# Patient Record
Sex: FEMALE | Race: WHITE | NOT HISPANIC OR LATINO | Employment: FULL TIME | RURAL
[De-identification: names, ages, dates, MRNs, and addresses within clinical notes are randomized per-mention and may not be internally consistent; named-entity substitution may affect disease eponyms.]

---

## 2020-07-08 ENCOUNTER — HISTORICAL (OUTPATIENT)
Dept: ADMINISTRATIVE | Facility: HOSPITAL | Age: 30
End: 2020-07-08

## 2020-07-08 LAB — SARS-COV+SARS-COV-2 AG RESP QL IA.RAPID: NEGATIVE

## 2022-06-13 ENCOUNTER — HOSPITAL ENCOUNTER (OUTPATIENT)
Dept: RADIOLOGY | Facility: HOSPITAL | Age: 32
Discharge: HOME | End: 2022-06-13
Attending: NURSE PRACTITIONER
Payer: COMMERCIAL

## 2022-06-13 DIAGNOSIS — M25.569 KNEE PAIN: ICD-10-CM

## 2022-06-13 DIAGNOSIS — S89.90XA KNEE INJURY: ICD-10-CM

## 2022-06-13 DIAGNOSIS — M25.569 KNEE PAIN: Primary | ICD-10-CM

## 2022-06-13 PROCEDURE — 73562 X-RAY EXAM OF KNEE 3: CPT | Mod: TC,RT

## 2022-08-11 ENCOUNTER — HOSPITAL ENCOUNTER (OUTPATIENT)
Facility: HOSPITAL | Age: 32
Discharge: HOME | End: 2022-08-11
Attending: ORTHOPAEDIC SURGERY | Admitting: ORTHOPAEDIC SURGERY
Payer: COMMERCIAL

## 2022-08-11 ENCOUNTER — ANESTHESIA EVENT (OUTPATIENT)
Dept: SURGERY | Facility: HOSPITAL | Age: 32
End: 2022-08-11
Payer: COMMERCIAL

## 2022-08-11 ENCOUNTER — ANESTHESIA (OUTPATIENT)
Dept: SURGERY | Facility: HOSPITAL | Age: 32
End: 2022-08-11
Payer: COMMERCIAL

## 2022-08-11 VITALS
HEART RATE: 71 BPM | RESPIRATION RATE: 16 BRPM | SYSTOLIC BLOOD PRESSURE: 124 MMHG | DIASTOLIC BLOOD PRESSURE: 76 MMHG | OXYGEN SATURATION: 96 % | TEMPERATURE: 98 F

## 2022-08-11 DIAGNOSIS — S83.241A ACUTE MEDIAL MENISCUS TEAR OF RIGHT KNEE: ICD-10-CM

## 2022-08-11 PROBLEM — M25.361 PATELLAR INSTABILITY OF RIGHT KNEE: Status: ACTIVE | Noted: 2022-08-11

## 2022-08-11 LAB — POC URINE HCG: NEGATIVE

## 2022-08-11 PROCEDURE — D9220A PRA ANESTHESIA: Mod: ANES,,, | Performed by: ANESTHESIOLOGY

## 2022-08-11 PROCEDURE — 25000003 PHARM REV CODE 250: Performed by: NURSE ANESTHETIST, CERTIFIED REGISTERED

## 2022-08-11 PROCEDURE — 27000655: Performed by: ANESTHESIOLOGY

## 2022-08-11 PROCEDURE — 36000710: Performed by: ORTHOPAEDIC SURGERY

## 2022-08-11 PROCEDURE — D9220A PRA ANESTHESIA: ICD-10-PCS | Mod: CRNA,,, | Performed by: NURSE ANESTHETIST, CERTIFIED REGISTERED

## 2022-08-11 PROCEDURE — 37000009 HC ANESTHESIA EA ADD 15 MINS: Performed by: ORTHOPAEDIC SURGERY

## 2022-08-11 PROCEDURE — 63600175 PHARM REV CODE 636 W HCPCS: Performed by: NURSE ANESTHETIST, CERTIFIED REGISTERED

## 2022-08-11 PROCEDURE — 63600175 PHARM REV CODE 636 W HCPCS: Performed by: ORTHOPAEDIC SURGERY

## 2022-08-11 PROCEDURE — D9220A PRA ANESTHESIA: Mod: CRNA,,, | Performed by: NURSE ANESTHETIST, CERTIFIED REGISTERED

## 2022-08-11 PROCEDURE — 27000716 HC OXISENSOR PROBE, ANY SIZE: Performed by: ANESTHESIOLOGY

## 2022-08-11 PROCEDURE — 71000015 HC POSTOP RECOV 1ST HR: Performed by: ORTHOPAEDIC SURGERY

## 2022-08-11 PROCEDURE — 36000711: Performed by: ORTHOPAEDIC SURGERY

## 2022-08-11 PROCEDURE — D9220A PRA ANESTHESIA: ICD-10-PCS | Mod: ANES,,, | Performed by: ANESTHESIOLOGY

## 2022-08-11 PROCEDURE — 25000003 PHARM REV CODE 250: Performed by: ORTHOPAEDIC SURGERY

## 2022-08-11 PROCEDURE — 27000177 HC AIRWAY, LARYNGEAL MASK: Performed by: ANESTHESIOLOGY

## 2022-08-11 PROCEDURE — 27000510 HC BLANKET BAIR HUGGER ANY SIZE: Performed by: ANESTHESIOLOGY

## 2022-08-11 PROCEDURE — 37000008 HC ANESTHESIA 1ST 15 MINUTES: Performed by: ORTHOPAEDIC SURGERY

## 2022-08-11 PROCEDURE — 71000033 HC RECOVERY, INTIAL HOUR: Performed by: ORTHOPAEDIC SURGERY

## 2022-08-11 PROCEDURE — 97161 PT EVAL LOW COMPLEX 20 MIN: CPT

## 2022-08-11 PROCEDURE — 27201423 OPTIME MED/SURG SUP & DEVICES STERILE SUPPLY: Performed by: ORTHOPAEDIC SURGERY

## 2022-08-11 RX ORDER — BUPIVACAINE HYDROCHLORIDE 2.5 MG/ML
INJECTION, SOLUTION EPIDURAL; INFILTRATION; INTRACAUDAL
Status: DISCONTINUED | OUTPATIENT
Start: 2022-08-11 | End: 2022-08-11 | Stop reason: HOSPADM

## 2022-08-11 RX ORDER — EPINEPHRINE 1 MG/ML
INJECTION, SOLUTION INTRACARDIAC; INTRAMUSCULAR; INTRAVENOUS; SUBCUTANEOUS
Status: DISCONTINUED | OUTPATIENT
Start: 2022-08-11 | End: 2022-08-11 | Stop reason: HOSPADM

## 2022-08-11 RX ORDER — OXYCODONE AND ACETAMINOPHEN 10; 325 MG/1; MG/1
1 TABLET ORAL EVERY 6 HOURS PRN
Qty: 28 TABLET | Refills: 0 | Status: SHIPPED | OUTPATIENT
Start: 2022-08-11 | End: 2023-10-22 | Stop reason: CLARIF

## 2022-08-11 RX ORDER — PROMETHAZINE HYDROCHLORIDE 25 MG/1
25 TABLET ORAL EVERY 6 HOURS PRN
Status: DISCONTINUED | OUTPATIENT
Start: 2022-08-11 | End: 2022-08-11 | Stop reason: HOSPADM

## 2022-08-11 RX ORDER — PHENYLEPHRINE HYDROCHLORIDE 10 MG/ML
INJECTION INTRAVENOUS
Status: DISCONTINUED | OUTPATIENT
Start: 2022-08-11 | End: 2022-08-11

## 2022-08-11 RX ORDER — HYDROCODONE BITARTRATE AND ACETAMINOPHEN 10; 325 MG/1; MG/1
1 TABLET ORAL EVERY 4 HOURS PRN
Status: DISCONTINUED | OUTPATIENT
Start: 2022-08-11 | End: 2022-08-11 | Stop reason: HOSPADM

## 2022-08-11 RX ORDER — ACETAMINOPHEN 500 MG
1000 TABLET ORAL EVERY 6 HOURS PRN
Status: DISCONTINUED | OUTPATIENT
Start: 2022-08-11 | End: 2022-08-11 | Stop reason: HOSPADM

## 2022-08-11 RX ORDER — HYDROCODONE BITARTRATE AND ACETAMINOPHEN 5; 325 MG/1; MG/1
1 TABLET ORAL EVERY 4 HOURS PRN
Status: DISCONTINUED | OUTPATIENT
Start: 2022-08-11 | End: 2022-08-11 | Stop reason: HOSPADM

## 2022-08-11 RX ORDER — PROPOFOL 10 MG/ML
VIAL (ML) INTRAVENOUS
Status: DISCONTINUED | OUTPATIENT
Start: 2022-08-11 | End: 2022-08-11

## 2022-08-11 RX ORDER — FENTANYL CITRATE 50 UG/ML
INJECTION, SOLUTION INTRAMUSCULAR; INTRAVENOUS
Status: DISCONTINUED | OUTPATIENT
Start: 2022-08-11 | End: 2022-08-11

## 2022-08-11 RX ORDER — LIDOCAINE HYDROCHLORIDE 20 MG/ML
INJECTION, SOLUTION EPIDURAL; INFILTRATION; INTRACAUDAL; PERINEURAL
Status: DISCONTINUED | OUTPATIENT
Start: 2022-08-11 | End: 2022-08-11

## 2022-08-11 RX ORDER — ONDANSETRON 2 MG/ML
INJECTION INTRAMUSCULAR; INTRAVENOUS
Status: DISCONTINUED | OUTPATIENT
Start: 2022-08-11 | End: 2022-08-11

## 2022-08-11 RX ORDER — MIDAZOLAM HYDROCHLORIDE 1 MG/ML
INJECTION INTRAMUSCULAR; INTRAVENOUS
Status: DISCONTINUED | OUTPATIENT
Start: 2022-08-11 | End: 2022-08-11

## 2022-08-11 RX ORDER — DEXAMETHASONE SODIUM PHOSPHATE 4 MG/ML
INJECTION, SOLUTION INTRA-ARTICULAR; INTRALESIONAL; INTRAMUSCULAR; INTRAVENOUS; SOFT TISSUE
Status: DISCONTINUED | OUTPATIENT
Start: 2022-08-11 | End: 2022-08-11

## 2022-08-11 RX ORDER — SODIUM CHLORIDE 9 MG/ML
INJECTION, SOLUTION INTRAVENOUS CONTINUOUS
Status: DISCONTINUED | OUTPATIENT
Start: 2022-08-11 | End: 2022-08-11 | Stop reason: HOSPADM

## 2022-08-11 RX ORDER — CEFAZOLIN SODIUM 2 G/50ML
2 SOLUTION INTRAVENOUS
Status: COMPLETED | OUTPATIENT
Start: 2022-08-11 | End: 2022-08-11

## 2022-08-11 RX ORDER — ONDANSETRON 4 MG/1
8 TABLET, ORALLY DISINTEGRATING ORAL EVERY 8 HOURS PRN
Status: DISCONTINUED | OUTPATIENT
Start: 2022-08-11 | End: 2022-08-11 | Stop reason: HOSPADM

## 2022-08-11 RX ADMIN — HYDROCODONE BITARTRATE AND ACETAMINOPHEN 1 TABLET: 10; 325 TABLET ORAL at 05:08

## 2022-08-11 RX ADMIN — SODIUM CHLORIDE: 9 INJECTION, SOLUTION INTRAVENOUS at 01:08

## 2022-08-11 RX ADMIN — MIDAZOLAM 2 MG: 1 INJECTION INTRAMUSCULAR; INTRAVENOUS at 01:08

## 2022-08-11 RX ADMIN — PHENYLEPHRINE HYDROCHLORIDE 100 MCG: 10 INJECTION INTRAVENOUS at 02:08

## 2022-08-11 RX ADMIN — ONDANSETRON 4 MG: 2 INJECTION INTRAMUSCULAR; INTRAVENOUS at 02:08

## 2022-08-11 RX ADMIN — FENTANYL CITRATE 50 MCG: 50 INJECTION INTRAMUSCULAR; INTRAVENOUS at 02:08

## 2022-08-11 RX ADMIN — PROPOFOL 200 MG: 10 INJECTION, EMULSION INTRAVENOUS at 02:08

## 2022-08-11 RX ADMIN — LIDOCAINE HYDROCHLORIDE 50 MG: 20 INJECTION, SOLUTION EPIDURAL; INFILTRATION; INTRACAUDAL; PERINEURAL at 02:08

## 2022-08-11 RX ADMIN — DEXAMETHASONE SODIUM PHOSPHATE 8 MG: 4 INJECTION, SOLUTION INTRA-ARTICULAR; INTRALESIONAL; INTRAMUSCULAR; INTRAVENOUS; SOFT TISSUE at 02:08

## 2022-08-11 RX ADMIN — FENTANYL CITRATE 25 MCG: 50 INJECTION INTRAMUSCULAR; INTRAVENOUS at 03:08

## 2022-08-11 RX ADMIN — FENTANYL CITRATE 50 MCG: 50 INJECTION INTRAMUSCULAR; INTRAVENOUS at 03:08

## 2022-08-11 RX ADMIN — CEFAZOLIN SODIUM 2 G: 1 INJECTION, POWDER, FOR SOLUTION INTRAMUSCULAR; INTRAVENOUS at 02:08

## 2022-08-11 NOTE — ANESTHESIA POSTPROCEDURE EVALUATION
Anesthesia Post Evaluation    Patient: Rosalia Toth    Procedure(s) Performed: Procedure(s) (LRB):  ARTHROSCOPY, KNEE (Right)  ARTHROSCOPY, KNEE, WITH MENISCECTOMY, PARTIAL MEDIAL AND LATERAL (Right)  ARTHROSCOPY, KNEE, WITH PATELLOFEMORAL RELEASE (Right)  ARTHROSCOPIC REALIGNMENT, PATELLA (Right)    Final Anesthesia Type: general      Patient location during evaluation: PACU  Post-procedure vital signs: reviewed and stable  Pain management: adequate  Airway patency: patent  NABILA mitigation strategies: Extubation and recovery carried out in lateral, semiupright, or other nonsupine position  PONV status at discharge: No PONV  Anesthetic complications: no      Cardiovascular status: hemodynamically stable  Respiratory status: unassisted  Hydration status: euvolemic  Follow-up not needed.          Vitals Value Taken Time   /76 08/11/22 1730   Temp 36.6 °C (97.9 °F) 08/11/22 1610   Pulse 75 08/11/22 1740   Resp 16 08/11/22 1724   SpO2 98 % 08/11/22 1740   Vitals shown include unvalidated device data.      Event Time   Out of Recovery 16:40:00         Pain/Alana Score: Pain Rating Prior to Med Admin: 4 (8/11/2022  5:24 PM)  Alana Score: 10 (8/11/2022  4:35 PM)

## 2022-08-11 NOTE — INTERVAL H&P NOTE
The patient has been examined and the H&P has been reviewed:    I concur with the findings and no changes have occurred since H&P was written.    Surgery risks, benefits and alternative options discussed and understood by patient/family.          Active Hospital Problems    Diagnosis  POA    *Acute medial meniscus tear of right knee [S83.241A]  Yes    Patellar instability of right knee [M25.361]  Yes      Resolved Hospital Problems   No resolved problems to display.

## 2022-08-11 NOTE — ANESTHESIA PROCEDURE NOTES
Intubation    Date/Time: 8/11/2022 2:06 PM  Performed by: Freddy Wan CRNA  Authorized by: Que Abarca MD     Intubation:     Induction:  Intravenous    Intubated:  Postinduction    Mask Ventilation:  Easy mask    Attempts:  1    Attempted By:  CRNA    Method of Intubation:  Direct    Difficult Airway Encountered?: No      Complications:  None    Airway Device:  Supraglottic airway/LMA    Airway Device Size:  4.0    Style/Cuff Inflation:  Cuffed (inflated to minimal occlusive pressure)    Placement Verified By:  Capnometry    Complicating Factors:  None    Findings Post-Intubation:  BS equal bilateral and atraumatic/condition of teeth unchanged

## 2022-08-11 NOTE — OP NOTE
[facility] - Orthopedic Periop Services  General Surgery  Operative Note    SUMMARY     Date of Procedure: 8/11/2022     Procedure: Procedure(s) (LRB):  ARTHROSCOPY, KNEE (Right)  ARTHROSCOPY, KNEE, WITH MENISCECTOMY, PARTIAL MEDIAL AND LATERAL (Right)  ARTHROSCOPY, KNEE, WITH PATELLOFEMORAL RELEASE (Right)  ARTHROSCOPIC REALIGNMENT, PATELLA (Right)       Surgeon(s) and Role:     * Zelalem Irby MD - Primary    Assisting Surgeon: None    Pre-Operative Diagnosis: Acute pain of right knee [M25.561]  Acute medial meniscus tear of right knee, initial encounter [S83.241A]  Patellar dislocation, right, initial encounter [S83.004A]    Post-Operative Diagnosis: Post-Op Diagnosis Codes:     * Acute pain of right knee [M25.561]     * Acute medial meniscus tear of right knee, initial encounter [S83.241A]     * Patellar dislocation, right, initial encounter [S83.004A]    Anesthesia: Choice    Operative Findings (including complications, if any):  After risks and benefits of procedure explained at length the patient stating she understands risks and benefits written informed consent was obtained patient was taken operating room placed in supine position on operative table.  This time anesthesia was induced per Anesthesia right lower extremity had a tourniquet placed over cast padding this was placed in arthroscopic leg hyman left knee placed on a well leg hyman.  At this time right lower extremities prepped draped sterile fashion after through the table been let down.  Inferior medial inferior lateral portals were marked on the skin injected his was a superior lateral portal marked on the skin this time inferior lateral portal was made the inferior border the patella on the lateral border the patellar tendon stab incision made blunt trocar used to introduce the cannula into the knee.  Camera introduced knee inflated with saline.  The patella was noted be tracking laterally there was a tear of the retinaculum medially medial  gutter the retinacular tear was noted medial joint line there was tear of the posterior horn extending the body of the medial meniscus needing debridement ACL PCL popliteus tendons were intact lateral meniscus had a small tear of the body lateral meniscus.  Medial portal was then made placing a needle followed by stab incision.  Probe placed in medial meniscus tear confirmed lateral meniscus tear confirmed retinacular tear confirmed ACL PCL popliteus tendons were intact.  This time biter was placed in the medial joint line medial meniscus tear debrided shaver used to remove any debrided material smooth edge.  Probe placed in the further tears medially.  Biter placed in followed by the shaver in lateral joint line in the lateral meniscus tear was debrided and the knee debrided material removed with a shaver the edge smoothed.  Probe placed in the further tears laterally.  At this time a superior lateral portal was made placing a needle followed by stab incision cannula placed.  Working through the cannula superiorly with the camera lateral portal using a 2 we needle the 1. PDS was passed in mattress fashion 3 sutures placed medially to repair the retinaculum.  Once the sutures were placed brought out through the cannula lateral patellofemoral release was performed using arthroscopic Bovie and released in retinaculum from superior to inferior pole of patella laterally.  At this time the 3 PDS sutures were then tied individually repaired the retinaculum and performing a medial realignment of the patella.  The patella was then checked and noted to be tracking is normal position on the femur.  There was some mild grade 1 chondromalacia of the medial facet of the patella and the lateral facet of the patella no osteochondral lesion was noted.  This time instruments fluid removed portals closed with 4-0 nylon horizontal mattress suture sterile occlusive dressing placed followed by the immobilizer.  Patient was awakened  taken recovery in good condition.  All counts correct.  No complications.    Description of Technical Procedures:     Significant Surgical Tasks Conducted by the Assistant(s), if Applicable:     Estimated Blood Loss (EBL): * No values recorded between 8/11/2022  3:02 PM and 8/11/2022  4:01 PM *           Implants: * No implants in log *    Specimens:   Specimen (24h ago, onward)            None                  Condition: Good    Disposition: PACU - hemodynamically stable.    Attestation: I was present and scrubbed for the entire procedure.

## 2022-08-11 NOTE — TRANSFER OF CARE
Anesthesia Transfer of Care Note    Patient: Rosalia Toth    Procedure(s) Performed: Procedure(s) (LRB):  ARTHROSCOPY, KNEE (Right)  ARTHROSCOPY, KNEE, WITH MENISCECTOMY, PARTIAL MEDIAL AND LATERAL (Right)  ARTHROSCOPY, KNEE, WITH PATELLOFEMORAL RELEASE (Right)  ARTHROSCOPIC REALIGNMENT, PATELLA (Right)    Patient location: PACU    Anesthesia Type: general    Transport from OR: Transported from OR on 6-10 L/min O2 by face mask with adequate spontaneous ventilation    Post pain: adequate analgesia    Post assessment: no apparent anesthetic complications    Post vital signs: stable    Level of consciousness: awake and alert    Nausea/Vomiting: no nausea/vomiting    Complications: none    Transfer of care protocol was followed      Last vitals:   Visit Vitals  BP (!) 110/57   Pulse 66   Temp 36.6 °C (97.9 °F)   Resp 17   LMP 08/11/2022   SpO2 99%   Breastfeeding No

## 2022-08-11 NOTE — PLAN OF CARE
1609  Pt vs stable pt resting well pt sao2 100% on 7l fm pt dsg d/i to r knee imobilizer intact  Toes to r foot with good color cap refill less than 3 sec will monitor..    1640  Pt vs stable pt resting well pt sao2 93% on ra pt voices no complaints pt pain level 0 orders to release to room per md    8280 pt released to a floyd rn b/p 120/68 pulse 88 resp 16 sao2 95% on ra

## 2022-08-11 NOTE — PLAN OF CARE
Problem: Physical Therapy  Goal: Physical Therapy Goal  Description: Short Term Goals  Independent with HEP  Independent with crutchesx 10 feet FWB/WBAT: right lower extremity    Long term goals  Needed equipment for home.       Outcome: Ongoing, Progressing

## 2022-08-11 NOTE — H&P (VIEW-ONLY)
Department of Orthopedic Surgery    History and Physical         HISTORY:  32-year-old female sustained an injury to her right knee sustaining a medial meniscus tear with dislocation of the patella with medial retinacular tear and osteochondral injury of the medial facet of the patella needing arthroscopic evaluation debridement possible repair of the knee with lateral patellofemoral release and medial retinacular repair    PAST MEDICAL HISTORY:   Past Medical History:   Diagnosis Date    Polycystic kidney         PAST SURGICAL HISTORY:   Past Surgical History:   Procedure Laterality Date    SURGICAL REMOVAL OF MASS OF CHEEK            ALLERGIES: Review of patient's allergies indicates:  No Known Allergies       MEDICATIONS: (Not in a hospital admission)       SOCIAL HISTORY:   Social History     Socioeconomic History    Marital status:    Tobacco Use    Smoking status: Never Smoker    Smokeless tobacco: Never Used   Substance and Sexual Activity    Alcohol use: Never    Drug use: Never          FAMILY HISTORY:   Family History   Problem Relation Age of Onset    Polycystic kidney disease Father     Hypertension Father           PHYSICAL EXAM:   There were no vitals filed for this visit.  Body mass index is 36.28 kg/m².     In general, this is a well-developed, well-nourished female . The patient is alert, oriented and cooperative.      HEENT:  Normocephalic, atraumatic.  Extraocular movements are intact bilaterally.  The oropharynx is benign.       NECK:  Nontender with good range of motion.      LUNGS:  Clear to auscultation bilaterally.      HEART:  Demonstrates a regular rate and rhythm.  No murmurs appreciated.      ABDOMEN:  Soft, non-tender, non-distended.        EXTREMITIES:  Right lower extremity she moves her toes she has sensation to touch has palpable pulses she has 1+ effusion she is tender palpation over her medial retinaculum over the lateral aspect of her patella she also has pain  over the medial joint line there is 1+ effusion noted no instability on varus valgus stress she does have positive apprehension test      RADIOGRAPHIC FINDINGS:  X-rays show no fracture subluxation the knee on the right MRI shows osteochondral injury of the patella with medial meniscus tear medial retinacular tear      IMPRESSION:  Patellar instability medial retinacular tear medial meniscus tear with osteochondral injury the patella right knee      PLAN:  Arthroscopic evaluation debridement possible repair of the right knee with lateral patellofemoral release medial retinacular repair    I had a long discussion with the patient about treatment options, including operative and nonoperative treatments. We discussed pros and cons of each including risks pertinent to surgery including pain, infection, bleeding, damage to adjacent structures like nerves and blood vessels, failure to heal, need for future surgeries, stiffness, instability, loss of limb, anesthesia risks like stroke, blood clot, loss of life. We discussed the possibility of need for later hardware removal in the case that hardware was used. We discussed common and uncommon risks, and discussed patient specific factors that may increase the risks present with surgery. All questions were answered. The patient expressed understanding of the pros and cons of surgery and wanted to proceed with surgical treatment.        (Subject to voice recognition error, transcription service not allowed)

## 2022-08-11 NOTE — ANESTHESIA PREPROCEDURE EVALUATION
08/11/2022  Rosalia Toth is a 32 y.o., female.      Pre-op Assessment    I have reviewed the Patient Summary Reports.    I have reviewed the NPO Status.   I have reviewed the Medications.     Review of Systems         Anesthesia Plan  Type of Anesthesia, risks & benefits discussed:    Anesthesia Type: Gen Supraglottic Airway  Intra-op Monitoring Plan: Standard ASA Monitors  Post Op Pain Control Plan: IV/PO Opioids PRN  Induction:  IV  Informed Consent: Informed consent signed with the Patient and all parties understand the risks and agree with anesthesia plan.  All questions answered.   ASA Score: 2    Ready For Surgery From Anesthesia Perspective.     .  NPO greater than 8 hours  NAC  NKDA    HCG negative    Polycystic kidney disease    Airway exam deferred (COVID precautions); adequate ROM at neck.

## 2022-08-12 NOTE — PT/OT/SLP EVAL
Physical Therapy Evaluation    Patient Name:  Rosalia Toth   MRN:  [id number]    Recommendations:     Discharge Recommendations:  home   Discharge Equipment Recommendations: crutches   Barriers to discharge: None    Assessment:     Rosalia Toth is a 32 y.o. female admitted with a medical diagnosis of Acute medial meniscus tear of right knee.  She presents with the following impairments/functional limitations:  decreased ROM, gait instability Patient with good understanding of crutches wbat and need for knee immobilization.    Rehab Prognosis: Good; patient would benefit from acute skilled PT services to address these deficits and reach maximum level of function.    Recent Surgery: Procedure(s) (LRB):  ARTHROSCOPY, KNEE (Right)  ARTHROSCOPY, KNEE, WITH MENISCECTOMY, PARTIAL MEDIAL AND LATERAL (Right)  ARTHROSCOPY, KNEE, WITH PATELLOFEMORAL RELEASE (Right)  ARTHROSCOPIC REALIGNMENT, PATELLA (Right) Day of Surgery    Plan:     During this hospitalization, patient to be seen 1 x/week to address the identified rehab impairments via gait training, therapeutic activities and progress toward the following goals:    · Plan of Care Expires:  08/11/22    Subjective     Chief Complaint: post op pain  Patient/Family Comments/goals: Plan is for home today  Pain/Comfort:  · Pain Rating 1: 4/10  · Location - Side 1: Right  · Location 1: knee  · Pain Addressed 1: Cessation of Activity  · Pain Addressed 2: Cessation of Activity    Patients cultural, spiritual, [employer] conflicts given the current situation: no    Living Environment:  Lives with family  Prior to admission, patients level of function was independent.  Equipment used at home: none.  DME owned (not currently used): rolling walker.  Upon discharge, patient will have assistance from .    Objective:     Communicated with nurse prior to session.  Patient found supine with    upon PT entry to room.    General Precautions: Standard, fall   Orthopedic Precautions:     Braces: Knee immobilizer  Respiratory Status: Room air    Exams:  · na     Functional Mobility:  · Gait: ambulated 20 feet with crutches wbat    Therapeutic Activities and Exercises:   HEP aps, slr, qs    AM-PAC 6 CLICK MOBILITY  Total Score:18     Patient left supine with call button in reach.    GOALS:   Multidisciplinary Problems     Physical Therapy Goals        Problem: Physical Therapy    Goal Priority Disciplines Outcome Goal Variances Interventions   Physical Therapy Goal     PT, PT/OT Ongoing, Progressing     Description: Short Term Goals  Independent with HEP  Independent with crutchesx 10 feet FWB/WBAT: right lower extremity    Long term goals  Needed equipment for home.                        History:     Past Medical History:   Diagnosis Date    Polycystic kidney        Past Surgical History:   Procedure Laterality Date    SURGICAL REMOVAL OF MASS OF CHEEK         Time Tracking:     PT Received On: 08/11/22  PT Start Time: 1630     PT Stop Time: 1655  PT Total Time (min): 25 min     Billable Minutes: Evaluation 20 08/11/2022

## 2022-08-30 ENCOUNTER — CLINICAL SUPPORT (OUTPATIENT)
Dept: REHABILITATION | Facility: HOSPITAL | Age: 32
End: 2022-08-30
Payer: COMMERCIAL

## 2022-08-30 DIAGNOSIS — M25.561 ACUTE PAIN OF RIGHT KNEE: ICD-10-CM

## 2022-08-30 DIAGNOSIS — S83.241A ACUTE MEDIAL MENISCUS TEAR OF RIGHT KNEE, INITIAL ENCOUNTER: ICD-10-CM

## 2022-08-30 PROCEDURE — 97161 PT EVAL LOW COMPLEX 20 MIN: CPT

## 2022-08-30 NOTE — PROGRESS NOTES
"[facility]  Physical Therapy Initial Evaluation    Name: Rosalia Toth  [facility] Number: [id number]    Encounter Diagnoses   Name Primary?    Acute pain of right knee     Acute medial meniscus tear of right knee, initial encounter      Physician: Zelalem Irby MD     Physician Orders: PT Eval and Treat  Medical Diagnosis from Referral: Acute pain of right knee [M25.561], Acute medial meniscus tear of right knee, initial encounter [S83.241A]  Evaluation Date: 8/30/2022  Authorization Period Expiration: 2023  Plan of Care Expiration: 11/1/2022      Visit # / Visits authorized: 1 / 1 (eval)     FOTO: Visit #1 - Scored : 1 / 3     PRECAUTIONS: Standard Precautions, Orthopedic: no knee flexion past 30, Orthotic device type: knee immobilizer , Wearning schedule: MD will release, and Weight-bearing status: Weight-bearing as tolerated: RLMEME CHAPMAN Visit: 9/21//2022    Date of Surgery  8/11/2022   Surgery Performed Right Knee Medial Meniscus   Post-Op Percautions  "no flexion knee past 30° she weightbear as tolerates"     Time In: 1:10 PM  Time Out: 1:40 PM  Total Appointment Time (timed & untimed codes): 30 minutes    SUBJECTIVE     Date of onset:     History of current condition - Rosalia is a 32 y.o. female whom presents to physical therapy status-post right medial meniscus by Dr. Zelalem Irby on 8/11/2022.  Rosalia  is Post-Op Day #19 and is currently ambulating Amy with no AD.    ERICKA: Brigette fell in her house and dislocated her knee in middle of june  Falls: June  Physician Instructions (per patient): No bending 30 degrees  Other concerns:     Imaging, No updated imaging for this episode of care:     Prior Therapy: N/A  Social History: Pt lives with their family  Living Environment:   ADLs unable to complete: 100% can do ADLS but not fully functional  Gym/Home Equipment:   Occupation: Pt is   Prior Level of Function: Independent with all ADLs  Current Level of Function: 25% of " PLOF    Pain:  Current 1 /10, worst 4 /10, best 0 /10   Location: right anterior Knee  Description: Sharp  Aggravating Factors: getting up to fast  Easing Factors: rest    Pts goals: Pt reported goals are return to ADLS    _______________________________________________________  Medical History:   Past Medical History:   Diagnosis Date    Polycystic kidney        Surgical History:   Rosalia Toth  has a past surgical history that includes Surgical removal of mass of cheek; Arthroscopy of knee (Right, 8/11/2022); Knee arthroscopy w/ meniscectomy (Right, 8/11/2022); Arthroscopy of knee with arthroplasty of patellofemoral joint (Right, 8/11/2022); and Patella realignment (Right, 8/11/2022).    Medications:   Rosalia has a current medication list which includes the following prescription(s): citalopram, dextroamphetamine-amphetamine, and oxycodone-acetaminophen.    Allergies:   Review of patient's allergies indicates:  No Known Allergies       OBJECTIVE     GIRTH/EDEMA:     KNEE   3 in Superior (cm) Mid Patella  (cm) 6 in Inferior  (cm) GOALS  (cm)   RIGHT 17.5   No goal   LEFT 19   No goal     RANGE OF MOTION:    Knee AROM/PROM Right   Left   Goal   Hyper - Zero - Flexion (PRE)     Ext -3 0 0-0-135  Initial: ext - 0        STRENGTH:     Right   Left   Pain/Dysfunction with Movement Goal   QUAD LAG (deg) 7 degrees 0 With straight leg raise in long seated position 0*     L/E MMT Right Goal   Hip Flexion  4+/5 5/5 B    Hip Extension  4+/5 5/5 B   Hip Abduction  4+/5 5/5 B   Hip IR 4+/5 5/5 B   Hip ER 4+/5 5/5 B   Knee Flexion 4+/5 5/5 B   Knee Extension 4+/5 5/5 B   Ankle DF 4+/5 5/5 B   Ankle PF 4+/5 5/5 B   Ankle Inversion 4+/5 5/5 B   Ankle Eversion 4+/5 5/5 B     L/E MMT Left Goal   Hip Flexion  4+/5 5/5 B    Hip Extension  4-/5 5/5 B   Hip Abduction  4+/5 5/5 B   Hip IR 4+/5 5/5 B   Hip ER 4+/5 5/5 B   Knee Flexion 4+/5 5/5 B   Knee Extension 4+/5 5/5 B   Ankle DF 4+/5 5/5 B   Ankle PF 4+/5 5/5 B   Ankle  Inversion 4+/5 5/5 B   Ankle Eversion 4+/5 5/5 B       MUSCLE LENGTH:     Muscle Tested  Right Left  Goal   Hamstrings  decreased decreased Normal B   Gastrocnemius  decreased decreased Normal B   Soleus  decreased decreased Normal B     JOINT MOBILITY:     Joint Motion Tested Right   Left    Goal   Patellar Glides: Superior Hypomobile Normal Normal B   Patellar Glides: Inferior Normal Normal Normal B   Patellar Glides: Medical Normal Normal Normal B   Patellar Tilts Normal Normal Normal B       SPECIAL TESTS: not tested due to post-operative state.    Sensation:  Sensation is intact to light touch    Palpation: Increased tone and tenderness noted with palpation to: medial quad tendon right knee    Posture:  Pt presents with postural abnormalities which include: forward head and rounded shoulders    Gait Analysis: The patient ambulated with the following assistive device: none; the pt presents with the following gait abnormalities: decreased knee flexion    FUNCTION:     CMS Impairment/Limitation/Restriction for FOTO KNEE Survey    Therapist reviewed FOTO scores for Rosalia Toth on 8/30/2022.   FOTO documents entered into Press - see Media section.    Limitation Score: 65%         TREATMENT     Total Treatment time separate from Evaluation:  minutes    MANUAL THERAPY techniques: Joint mobilizations, Soft tissue Mobilization, and/or mobilization with movement were applied to the areas listed below for  minutes , including: x = exercises performed     Manual Intervention Peformed    Soft Tissue Mobilization   Scar tissue mob + cross friction   Joint Mobilizations   Patellar Glides: sup/inf/med/lat/tilts   Mobilization + movement   Flexion/Extension          Plan for Next Visit:      THERAPEUTIC EXERCISES to develop strength, endurance, ROM, flexibility, and posture for  minutes including: x = exercises performed    TherEx Performed  Issued   Range of Motion: Recumbent Bike hold Hold until MD release    Quad Set x  Home exercise program review  x   Gastroc Stretch- towel x Home exercise program review  x   SLR   Home exercise program review  x   Low Load Long Duration Extension Hang x Home exercise program review  x   SL hip abduction x       Plan for Next Visit:        Plan for Next Visit:      SUPERVISED MODALITIES after being cleared for contradictions: NMES Electrical Stimulation:  Rosalia received NMES Electrical Stimulation to elicit muscle contraction of the quadriceps muscle complex. Pt received stimulation with the below parameters. Patient tolerated treatment well without any adverse effects.     Modality Time mA Ramp time Time ON/OFF Pad placement   NMES   5 seconds 10s / 10 s VMO (long); VL/Rectus/VI (perpendicular)       COLD PACK for  minutes to decrease inflammation, edema, and pain.       PATIENT EDUCATION AND HOME EXERCISES     Education/Self-Care provided: (included in treatment minutes)  Patient educated on the impairments noted above and the effects of physical therapy intervention to improve overall condition and QOL.   Patient was educated on all the above exercise prior/during/after for proper posture, positioning, and execution for safe performance with home exercise program.   Self Care:   Edema Education: provided to patient and/or family to lay supine with leg elevated above the heart while icing and outside of performing exercise (75% of day) for the first week.  Icin minutes per hour, 30 min on 30 min off, outside of exercises.       Written Home Exercises Provided: yes. Prefers: Printed Copies  Exercises were reviewed and Rosalia was able to demonstrate them prior to the end of the session.  Rosalia demonstrated good understanding of the education provided. See EMR under Patient Instructions for exercises provided during therapy sessions.     ASSESSMENT     Rosalia is a 32 y.o. female referred to outpatient physical therapy with a medical diagnosis of Acute pain of right knee [M25.561],  Acute medial meniscus tear of right knee, initial encounter [S85.839A]   Signs and symptoms are consistent with this stage of recovery, with physical exam findings that support decreased knee and hip ROM, decreased hip girdle, quad, and hamstring Strength, patellar joint hypomobility, increased joint and soft tissue edema, and impaired functional mobility. The above impairments will be addressed through manual therapy techniques, therapeutic exercises, functional training, and modalities as necessary. Patient was treated and educated on exercises for home program, progression of therapy, and benefits of therapy to achieve full functional mobility.     Pt prognosis is Good.   Pt will benefit from skilled outpatient Physical Therapy to address the deficits stated above and in the chart below, provide pt/family education, and to maximize pt's level of independence.     Plan of care discussed with patient: Yes  Pt's spiritual, cultural and educational needs considered and patient is agreeable to the plan of care and goals as stated below:         GOALS:    SHORT TERM GOALS: 4 weeks  Progress   Recent signs and systems trend is improving in order to progress towards LTG's.    Patient will improve Knee AROM to 0- 0-  in order to progress towards functional activities.    Patient will Demonstrate 0* quad lag with Straight leg raise in long sit.     Patient will be independent with HEP in order to further progress and return to maximal function.    Pain rating at Worst: 2/10 in order to progress towards increased independence with activity.    Patient will correct postural deviations in sitting and standing, to decrease pain and promote long term stability.      LONG TERM GOALS: 8 weeks  Progress   Patient will return to normal ADL, recreational, and work related activities with less pain and limitation.     Patient will improve AROM 0- 0- 125 to return to maximal functional potential.     Patient will improve Strength to 5  /5 in order to improve functional independence.     Patient will improve post-op edema girth to sound leg - for overall mechanics of the joint for muscle activation..    Pain Rating at Best: 1/10 to improve Quality of Life.     Patient will meet predicted functional outcome (FOTO) score: 10% to increase self-worth & perceived functional ability.        PLAN   Plan of care Certification: 8/30/2022 to 11/1/2022    Outpatient Physical Therapy 2 times weekly for 8 weeks to include any combination of the following interventions: virtual visits, dry needling, modalities, electrical stimulation (IFC, Pre-Mod, Attended with Functional Dry Needling), Manual Therapy, Moist Heat/ Ice, Neuromuscular Re-ed, Patient Education, Self Care, Therapeutic Activities, Therapeutic Exercise, Functional Training, and Therapeutic Activites     Thank you for this referral.    Sunil Ellison, PT, DPT      I CERTIFY THE NEED FOR THESE SERVICES FURNISHED UNDER THIS PLAN OF TREATMENT AND WHILE UNDER MY CARE  Physician's comments:    Physician's Signature: ___________________________________________________

## 2022-09-02 ENCOUNTER — CLINICAL SUPPORT (OUTPATIENT)
Dept: REHABILITATION | Facility: HOSPITAL | Age: 32
End: 2022-09-02
Payer: COMMERCIAL

## 2022-09-02 DIAGNOSIS — S83.241D ACUTE MEDIAL MENISCUS TEAR OF RIGHT KNEE, SUBSEQUENT ENCOUNTER: Primary | ICD-10-CM

## 2022-09-02 PROCEDURE — 97110 THERAPEUTIC EXERCISES: CPT | Mod: CQ

## 2022-09-02 PROCEDURE — 97032 APPL MODALITY 1+ESTIM EA 15: CPT | Mod: CQ

## 2022-09-02 NOTE — PROGRESS NOTES
"  Physical Therapy Treatment Note     Name: Rosalia Toth  Clinic Number: [id number]    Therapy Diagnosis: No diagnosis found.  Physician: Zelalem Irby MD    Visit Date: 9/2/2022    Physician Orders: PT Eval and Treat  Medical Diagnosis from Referral: Acute pain of right knee [M25.561], Acute medial meniscus tear of right knee, initial encounter [S83.241A]  Evaluation Date: 8/30/2022  Authorization Period Expiration: 2023  Plan of Care Expiration: 11/1/2022     Visit # / Visits authorized: 2/17   PTA Visit #: 2    Time In: 1010 (patient's treatment started before patient was checked in)   Time Out: 1040  Total Billable Time: 30 minutes    Precautions: Standard and orthopedic   PRECAUTIONS: Standard Precautions, Orthopedic: no knee flexion past 30, Orthotic device type: knee immobilizer , Wearning schedule: MD will release, and Weight-bearing status: Weight-bearing as tolerated: RLE       Subjective     Pt reports: "I feel great".  She was compliant with home exercise program.    Pain: 0/10  Location: right knee      Objective     Rosalia received therapeutic exercises to develop strength for 15 minutes including:  Slant board 2 x 30 sec each   Quad sets x 5 min with NMES  SAQ with towel roll x 5 min with NMES  SLR x 5 min with NMES  Calf raises 2 x 10  Hip abduction 2 x 10  Calf stretch 3 x 30 sec each     Rosalia received the following supervised modalities after being cleared for contradictions: NMES Electrical Stimulation:  Rosalia received NMES Electrical Stimulation to elicit muscle contraction of the right quad. Pt received stimulation at a rate of 35 pps with symmetric current, ramp of 2 seconds with 10 second on time and 20 second off time for 15 minutes. Patient tolerated treatment well without any adverse effects.     Rosalia received cold pack for 0 minutes to reduce swelling and pain.    Home Exercises Provided and Patient Education Provided     Education provided: no new exercises added today "     Written Home Exercises Provided: Patient instructed to cont prior HEP.  Exercises were reviewed and Rosalia was able to demonstrate them prior to the end of the session.  Rosalia demonstrated good  understanding of the education provided.     See EMR under Patient Instructions for exercises provided prior visit.    Assessment     Patient had no new complaints following therapy today.   Rosalia Is progressing well towards her goals.   Pt prognosis is Good.     Pt will continue to benefit from skilled outpatient physical therapy to address the deficits listed in the problem list box on initial evaluation, provide pt/family education and to maximize pt's level of independence in the home and community environment.     Pt's spiritual, cultural and educational needs considered and pt agreeable to plan of care and goals.     Anticipated barriers to physical therapy: none     Goals:    SHORT TERM GOALS: 4 weeks  Progress   Recent signs and systems trend is improving in order to progress towards LTG's.     Patient will improve Knee AROM to 0- 0-  in order to progress towards functional activities.     Patient will Demonstrate 0* quad lag with Straight leg raise in long sit.      Patient will be independent with HEP in order to further progress and return to maximal function.     Pain rating at Worst: 2/10 in order to progress towards increased independence with activity.     Patient will correct postural deviations in sitting and standing, to decrease pain and promote long term stability.        LONG TERM GOALS: 8 weeks  Progress   Patient will return to normal ADL, recreational, and work related activities with less pain and limitation.      Patient will improve AROM 0- 0- 125 to return to maximal functional potential.      Patient will improve Strength to 5 /5 in order to improve functional independence.      Patient will improve post-op edema girth to sound leg - for overall mechanics of the joint for muscle  activation..     Pain Rating at Best: 1/10 to improve Quality of Life.      Patient will meet predicted functional outcome (FOTO) score: 10% to increase self-worth & perceived functional ability.      Plan     Continue with appropriate POC    Estrella Badillo, PTA  9/2/2022

## 2022-09-07 ENCOUNTER — CLINICAL SUPPORT (OUTPATIENT)
Dept: REHABILITATION | Facility: HOSPITAL | Age: 32
End: 2022-09-07
Payer: COMMERCIAL

## 2022-09-07 DIAGNOSIS — M25.361 PATELLAR INSTABILITY OF RIGHT KNEE: ICD-10-CM

## 2022-09-07 DIAGNOSIS — S83.241D ACUTE MEDIAL MENISCUS TEAR OF RIGHT KNEE, SUBSEQUENT ENCOUNTER: Primary | ICD-10-CM

## 2022-09-07 PROCEDURE — 97110 THERAPEUTIC EXERCISES: CPT | Mod: CQ

## 2022-09-07 PROCEDURE — 97032 APPL MODALITY 1+ESTIM EA 15: CPT | Mod: CQ

## 2022-09-07 NOTE — PROGRESS NOTES
"  Physical Therapy Treatment Note     Name: Rosalia Toth  [facility] Number: [id number]    Therapy Diagnosis:   Encounter Diagnoses   Name Primary?    Acute medial meniscus tear of right knee, subsequent encounter Yes    Patellar instability of right knee      Physician: Zelalem Irby MD    Visit Date: 9/7/2022    Physician Orders: PT Eval and Treat  Medical Diagnosis from Referral: Acute pain of right knee [M25.561], Acute medial meniscus tear of right knee, initial encounter [S83.241A]  Evaluation Date: 8/30/2022  Authorization Period Expiration: 2023  Plan of Care Expiration: 11/1/2022     Visit # / Visits authorized: 3/17   PTA Visit #: 2    Time In: 1015  Time Out: 1042  Total Billable Time: 27 minutes    Precautions: Standard and orthopedic   PRECAUTIONS: Standard Precautions, Orthopedic: no knee flexion past 30, Orthotic device type: knee immobilizer , Wearning schedule: MD will release, and Weight-bearing status: Weight-bearing as tolerated: HEAVENLY CHAPMAN Visit: 9/21/2022    Subjective     Pt reports: "It feels good"  She was compliant with home exercise program.    Pain: 0/10  Location: right knee      Objective     Rosalia received therapeutic exercises to develop strength for 12 minutes including:  Slant board 2 x 30 seconds with quad sets  Bilateral Quad sets with NMES x 5 min 10 sec on and 20 second off  Bilateral SAQ with NMES x 5 min   SLR with NMES x 5 min 10 on and 20 off with verbal and tactile cues for quality of movement and to reduce the extensor lag  Calf raises 2 x 10  Hip abduction 2 x 10  Calf stretch 3 x 30 sec each       Rosalia received the following supervised modalities after being cleared for contradictions: NMES Electrical Stimulation:  Rosalia received NMES Electrical Stimulation to elicit muscle contraction of the right quad. Pt received stimulation at a rate of 45 pps with symmetric current, ramp of 2 seconds with 10 second on time and 20 second off time for 15 minutes. Patient " tolerated treatment well without any adverse effects.     Rosalia received the following manual therapy techniques: Soft tissue Mobilization were applied to the: right knee for 0 minutes, including:  STM including cross fiction massage and scar tissue mobilization      Rosalia received cold pack for 0 minutes to reduce swelling and pain.    Home Exercises Provided and Patient Education Provided     Education provided: no new exercises added today     Written Home Exercises Provided: Patient instructed to cont prior HEP.  Exercises were reviewed and Rosalia was able to demonstrate them prior to the end of the session.  Rosalia demonstrated good  understanding of the education provided.     See EMR under Patient Instructions for exercises provided prior visit.    Assessment     Patient had no new complaints following therapy today.   Rosalia Is progressing well towards her goals.   Pt prognosis is Good.     Pt will continue to benefit from skilled outpatient physical therapy to address the deficits listed in the problem list box on initial evaluation, provide pt/family education and to maximize pt's level of independence in the home and community environment.     Pt's spiritual, cultural and educational needs considered and pt agreeable to plan of care and goals.     Anticipated barriers to physical therapy: none     Goals:    SHORT TERM GOALS: 4 weeks  Progress   Recent signs and systems trend is improving in order to progress towards LTG's.     Patient will improve Knee AROM to 0- 0-  in order to progress towards functional activities.     Patient will Demonstrate 0* quad lag with Straight leg raise in long sit.      Patient will be independent with HEP in order to further progress and return to maximal function.     Pain rating at Worst: 2/10 in order to progress towards increased independence with activity.     Patient will correct postural deviations in sitting and standing, to decrease pain and promote long  term stability.        LONG TERM GOALS: 8 weeks  Progress   Patient will return to normal ADL, recreational, and work related activities with less pain and limitation.      Patient will improve AROM 0- 0- 125 to return to maximal functional potential.      Patient will improve Strength to 5 /5 in order to improve functional independence.      Patient will improve post-op edema girth to sound leg - for overall mechanics of the joint for muscle activation..     Pain Rating at Best: 1/10 to improve Quality of Life.      Patient will meet predicted functional outcome (FOTO) score: 10% to increase self-worth & perceived functional ability.      Plan     Continue with appropriate POC    Richard Multani, LAURO  9/7/2022

## 2022-09-09 ENCOUNTER — CLINICAL SUPPORT (OUTPATIENT)
Dept: REHABILITATION | Facility: HOSPITAL | Age: 32
End: 2022-09-09
Payer: COMMERCIAL

## 2022-09-09 DIAGNOSIS — S83.241D ACUTE MEDIAL MENISCUS TEAR OF RIGHT KNEE, SUBSEQUENT ENCOUNTER: Primary | ICD-10-CM

## 2022-09-09 DIAGNOSIS — M25.361 PATELLAR INSTABILITY OF RIGHT KNEE: ICD-10-CM

## 2022-09-09 PROCEDURE — 97110 THERAPEUTIC EXERCISES: CPT | Mod: CQ

## 2022-09-09 PROCEDURE — 97032 APPL MODALITY 1+ESTIM EA 15: CPT | Mod: CQ

## 2022-09-09 NOTE — PROGRESS NOTES
Physical Therapy Treatment Note     Name: Rosalia Toth  [facility] Number: [id number]    Therapy Diagnosis:   Encounter Diagnoses   Name Primary?    Acute medial meniscus tear of right knee, subsequent encounter Yes    Patellar instability of right knee      Physician: Zelalem Irby MD    Visit Date: 9/9/2022    Physician Orders: PT Eval and Treat  Medical Diagnosis from Referral: Acute pain of right knee [M25.561], Acute medial meniscus tear of right knee, initial encounter [S83.241A]  Evaluation Date: 8/30/2022  Authorization Period Expiration: 2023  Plan of Care Expiration: 11/1/2022     Visit # / Visits authorized: 4/17   PTA Visit #: 3    Time In: 1015  Time Out: 1045  Total Billable Time: 30 minutes    Precautions: Standard and orthopedic   PRECAUTIONS: Standard Precautions, Orthopedic: no knee flexion past 30, Orthotic device type: knee immobilizer , Wearning schedule: MD will release, and Weight-bearing status: Weight-bearing as tolerated: HEAVENLY CHAPMAN Visit: 9/21/2022    Subjective     Pt reports: no pain just soreness  She was compliant with home exercise program.    Pain: 0/10  Location: right knee      Objective     Rosalia received therapeutic exercises to develop strength for 15 minutes including:  Slant board 2 x 30 seconds with quad sets  Bilateral Quad sets with NMES x 5 min 10 sec on and 20 second off  Bilateral SAQ with NMES x 5 min   SLR with NMES x 5 min 10 on and 20 off with verbal and tactile cues for quality of movement and to reduce the extensor lag  Calf raises 2 x 10  Hip 3 way 2 x 10  Calf stretch 3 x 30 sec each      Rosalia received the following supervised modalities after being cleared for contradictions: NMES Electrical Stimulation:  Rosalia received NMES Electrical Stimulation to elicit muscle contraction of the right quad. Pt received stimulation at a rate of 45 pps with symmetric current, ramp of 2 seconds with 10 second on time and 20 second off time for 15 minutes. Patient  tolerated treatment well without any adverse effects.     Rosalia received the following manual therapy techniques: Soft tissue Mobilization were applied to the: right knee for 0 minutes, including:  STM including cross fiction massage and scar tissue mobilization    Rosalia received cold pack for 0 minutes to reduce swelling and pain.    Home Exercises Provided and Patient Education Provided     Education provided: no new exercises added today     Written Home Exercises Provided: Patient instructed to cont prior HEP.  Exercises were reviewed and Rosalia was able to demonstrate them prior to the end of the session.  Rosalia demonstrated good  understanding of the education provided.     See EMR under Patient Instructions for exercises provided prior visit.    Assessment     Patient had no new complaints following therapy today.   Rosalia Is progressing well towards her goals.   Pt prognosis is Good.     Pt will continue to benefit from skilled outpatient physical therapy to address the deficits listed in the problem list box on initial evaluation, provide pt/family education and to maximize pt's level of independence in the home and community environment.     Pt's spiritual, cultural and educational needs considered and pt agreeable to plan of care and goals.     Anticipated barriers to physical therapy: none     Goals:    SHORT TERM GOALS: 4 weeks  Progress   Recent signs and systems trend is improving in order to progress towards LTG's.     Patient will improve Knee AROM to 0- 0-  in order to progress towards functional activities.     Patient will Demonstrate 0* quad lag with Straight leg raise in long sit.      Patient will be independent with HEP in order to further progress and return to maximal function.     Pain rating at Worst: 2/10 in order to progress towards increased independence with activity.     Patient will correct postural deviations in sitting and standing, to decrease pain and promote long  term stability.        LONG TERM GOALS: 8 weeks  Progress   Patient will return to normal ADL, recreational, and work related activities with less pain and limitation.      Patient will improve AROM 0- 0- 125 to return to maximal functional potential.      Patient will improve Strength to 5 /5 in order to improve functional independence.      Patient will improve post-op edema girth to sound leg - for overall mechanics of the joint for muscle activation..     Pain Rating at Best: 1/10 to improve Quality of Life.      Patient will meet predicted functional outcome (FOTO) score: 10% to increase self-worth & perceived functional ability.      Plan     Continue with appropriate POC    Richard Multani, LAURO  9/9/2022

## 2022-09-13 ENCOUNTER — CLINICAL SUPPORT (OUTPATIENT)
Dept: REHABILITATION | Facility: HOSPITAL | Age: 32
End: 2022-09-13
Payer: COMMERCIAL

## 2022-09-13 DIAGNOSIS — S83.241D ACUTE MEDIAL MENISCUS TEAR OF RIGHT KNEE, SUBSEQUENT ENCOUNTER: Primary | ICD-10-CM

## 2022-09-13 DIAGNOSIS — M25.361 PATELLAR INSTABILITY OF RIGHT KNEE: ICD-10-CM

## 2022-09-13 PROCEDURE — 97110 THERAPEUTIC EXERCISES: CPT | Mod: CQ

## 2022-09-13 NOTE — PROGRESS NOTES
Physical Therapy Treatment Note     Name: Rosalia Toth  [facility] Number: [id number]    Therapy Diagnosis:   Encounter Diagnoses   Name Primary?    Acute medial meniscus tear of right knee, subsequent encounter Yes    Patellar instability of right knee        Physician: Zelalem Irby MD    Visit Date: 9/13/2022    Physician Orders: PT Eval and Treat  Medical Diagnosis from Referral: Acute pain of right knee [M25.561], Acute medial meniscus tear of right knee, initial encounter [S83.241A]  Evaluation Date: 8/30/2022  Authorization Period Expiration: 2023  Plan of Care Expiration: 11/1/2022     Visit # / Visits authorized: 5/17   PTA Visit #: 4    Time In: 1018  Time Out: 1050  Total Billable Time: 32 minutes    Precautions: Standard and orthopedic   PRECAUTIONS: Standard Precautions, Orthopedic: no knee flexion past 30, Orthotic device type: knee immobilizer , Wearning schedule: MD will release, and Weight-bearing status: Weight-bearing as tolerated: HEAVENLY CHAPMAN Visit: 9/21/2022    Subjective     Pt reports: no pain, mostly soreness  She was compliant with home exercise program.    Pain: 0/10  Location: right knee    Objective     Rosalia received therapeutic exercises to develop strength for 32 minutes including:  Slant board 2 x 30 seconds with quad sets  Quad sets 3 x 10  SAQ 3 x 10   SLR 3 x 10  SL hip abduction 2 x 10  Calf stretch 3 x 30 sec each  Calf raises 3 x 10  Hip 3 way 2 x 10  Mini squats with theraball 2 x 10 - knee flexion not exceeding 30 degrees       Rosalia received the following manual therapy techniques: Soft tissue Mobilization were applied to the: right knee for 0 minutes, including:  STM including cross fiction massage and scar tissue mobilization    Rosalia received cold pack for 0 minutes to reduce swelling and pain.    Home Exercises Provided and Patient Education Provided     Education provided: no new exercises added today     Written Home Exercises Provided: Patient instructed to  cont prior HEP.  Exercises were reviewed and Rosalia was able to demonstrate them prior to the end of the session.  Rosalia demonstrated good  understanding of the education provided.     See EMR under Patient Instructions for exercises provided prior visit.    Assessment     Patient able to perform all exercises without complaint and maintaining precautions. NMES not indicated secondary to no extensor lag.  Rosalia Is progressing well towards her goals.   Pt prognosis is Good.     Pt will continue to benefit from skilled outpatient physical therapy to address the deficits listed in the problem list box on initial evaluation, provide pt/family education and to maximize pt's level of independence in the home and community environment.     Pt's spiritual, cultural and educational needs considered and pt agreeable to plan of care and goals.     Anticipated barriers to physical therapy: none     Goals:    SHORT TERM GOALS: 4 weeks  Progress   Recent signs and systems trend is improving in order to progress towards LTG's.     Patient will improve Knee AROM to 0- 0-  in order to progress towards functional activities.     Patient will Demonstrate 0* quad lag with Straight leg raise in long sit.      Patient will be independent with HEP in order to further progress and return to maximal function.     Pain rating at Worst: 2/10 in order to progress towards increased independence with activity.     Patient will correct postural deviations in sitting and standing, to decrease pain and promote long term stability.        LONG TERM GOALS: 8 weeks  Progress   Patient will return to normal ADL, recreational, and work related activities with less pain and limitation.      Patient will improve AROM 0- 0- 125 to return to maximal functional potential.      Patient will improve Strength to 5 /5 in order to improve functional independence.      Patient will improve post-op edema girth to sound leg - for overall mechanics of the  joint for muscle activation..     Pain Rating at Best: 1/10 to improve Quality of Life.      Patient will meet predicted functional outcome (FOTO) score: 10% to increase self-worth & perceived functional ability.      Plan     Continue with appropriate POC    Richard Multani PTA  9/13/2022

## 2022-09-20 ENCOUNTER — CLINICAL SUPPORT (OUTPATIENT)
Dept: REHABILITATION | Facility: HOSPITAL | Age: 32
End: 2022-09-20
Payer: COMMERCIAL

## 2022-09-20 DIAGNOSIS — S83.241D ACUTE MEDIAL MENISCUS TEAR OF RIGHT KNEE, SUBSEQUENT ENCOUNTER: Primary | ICD-10-CM

## 2022-09-20 DIAGNOSIS — M25.361 PATELLAR INSTABILITY OF RIGHT KNEE: ICD-10-CM

## 2022-09-20 PROCEDURE — 97110 THERAPEUTIC EXERCISES: CPT

## 2022-09-20 NOTE — PROGRESS NOTES
Physical Therapy Treatment Note     Name: Rosalia Toth  [facility] Number: [id number]    Therapy Diagnosis:   Encounter Diagnoses   Name Primary?    Acute medial meniscus tear of right knee, subsequent encounter Yes    Patellar instability of right knee        Physician: Zelalem Irby MD    Visit Date: 9/20/2022    Physician Orders: PT Eval and Treat  Medical Diagnosis from Referral: Acute pain of right knee [M25.561], Acute medial meniscus tear of right knee, initial encounter [S83.241A]  Evaluation Date: 8/30/2022  Authorization Period Expiration: 2023  Plan of Care Expiration: 11/1/2022     Visit # / Visits authorized: 6/17   PTA Visit #: 0    Time In: 10:15 AM  Time Out: 10:45 AM  Total Billable Time: 30 minutes    Precautions: Standard and orthopedic   PRECAUTIONS: Standard Precautions, Orthopedic: no knee flexion past 30, Orthotic device type: knee immobilizer , Wearning schedule: MD will release, and Weight-bearing status: Weight-bearing as tolerated: HEAVENLY CHAPMAN Visit: 9/21/2022    Subjective     Pt reports: no pain, mostly soreness    She was compliant with home exercise program.    Pain: 0/10  Location: right knee    Objective     Rosalia received therapeutic exercises to develop strength for 30 minutes including:  Slant board 2 x 30 seconds with quad sets  Quad sets 3 x 10  SAQ 3 x 10   SLR 3 x 10  SL hip abduction 3 x 10  Calf stretch 3 x 30 sec each  Calf raises 3 x 10  Hip Hike 2 x 10   SLS x 1; x 1 on foam pad; x 2 catch (total 5 min)  Hip 3 way 2 x 10 (not today)  Mini squats with theraball 2 x 10 - knee flexion not exceeding 30 degrees       Rosalia received the following manual therapy techniques: Soft tissue Mobilization were applied to the: right knee for 0 minutes, including:  STM including cross fiction massage and scar tissue mobilization    Rosalia received cold pack for 0 minutes to reduce swelling and pain.    Home Exercises Provided and Patient Education Provided     Education  provided: no new exercises added today     Written Home Exercises Provided: Patient instructed to cont prior HEP.  Exercises were reviewed and Rosalia was able to demonstrate them prior to the end of the session.  Rosalia demonstrated good  understanding of the education provided.     See EMR under Patient Instructions for exercises provided prior visit.    Assessment     Patient tolerated the addition of all exercises with no complaints of pain. Patient required verbal and tactile cues  in order to facilitate weight shift and quadriceps strength ing with balance activities. Patient is progressing well. Continue to progress patient's POC as tolerated with an emphasis on range of motion and strength.     Rosalia Is progressing well towards her goals.   Pt prognosis is Good.     Pt will continue to benefit from skilled outpatient physical therapy to address the deficits listed in the problem list box on initial evaluation, provide pt/family education and to maximize pt's level of independence in the home and community environment.     Pt's spiritual, cultural and educational needs considered and pt agreeable to plan of care and goals.     Anticipated barriers to physical therapy: none     Goals:    SHORT TERM GOALS: 4 weeks  Progress   Recent signs and systems trend is improving in order to progress towards LTG's.     Patient will improve Knee AROM to 0- 0-  in order to progress towards functional activities.     Patient will Demonstrate 0* quad lag with Straight leg raise in long sit.      Patient will be independent with HEP in order to further progress and return to maximal function.     Pain rating at Worst: 2/10 in order to progress towards increased independence with activity.     Patient will correct postural deviations in sitting and standing, to decrease pain and promote long term stability.        LONG TERM GOALS: 8 weeks  Progress   Patient will return to normal ADL, recreational, and work related  activities with less pain and limitation.      Patient will improve AROM 0- 0- 125 to return to maximal functional potential.      Patient will improve Strength to 5 /5 in order to improve functional independence.      Patient will improve post-op edema girth to sound leg - for overall mechanics of the joint for muscle activation..     Pain Rating at Best: 1/10 to improve Quality of Life.      Patient will meet predicted functional outcome (FOTO) score: 10% to increase self-worth & perceived functional ability.      Plan     Continue with appropriate POC    Sunil Ellison, PT  9/20/2022

## 2022-09-23 ENCOUNTER — CLINICAL SUPPORT (OUTPATIENT)
Dept: REHABILITATION | Facility: HOSPITAL | Age: 32
End: 2022-09-23
Payer: COMMERCIAL

## 2022-09-23 DIAGNOSIS — S83.241D ACUTE MEDIAL MENISCUS TEAR OF RIGHT KNEE, SUBSEQUENT ENCOUNTER: Primary | ICD-10-CM

## 2022-09-23 DIAGNOSIS — M25.361 PATELLAR INSTABILITY OF RIGHT KNEE: ICD-10-CM

## 2022-09-23 PROCEDURE — 97112 NEUROMUSCULAR REEDUCATION: CPT | Mod: CQ

## 2022-09-23 PROCEDURE — 97110 THERAPEUTIC EXERCISES: CPT | Mod: CQ

## 2022-09-23 NOTE — PROGRESS NOTES
"  Physical Therapy Treatment Note     Name: Rosalia Toth  [facility] Number: [id number]    Therapy Diagnosis:   Encounter Diagnoses   Name Primary?    Acute medial meniscus tear of right knee, subsequent encounter Yes    Patellar instability of right knee        Physician: Zelalem Irby MD    Visit Date: 9/23/2022    Physician Orders: PT Eval and Treat  Medical Diagnosis from Referral: Acute pain of right knee [M25.561], Acute medial meniscus tear of right knee, initial encounter [S83.241A]  Evaluation Date: 8/30/2022  Authorization Period Expiration: 2023  Plan of Care Expiration: 11/1/2022     Visit # / Visits authorized: 7/17   PTA Visit #: 1    Time In: 1102  Time Out: 1140  Total Billable Time: 38 minutes    Precautions: Standard and orthopedic   PRECAUTIONS: Standard Precautions       Subjective     Pt reports: pain only with certain movements. Patient went to MD yesterday with him d/c immobilizer and stating she has no restrictions on knee flexion but to "not run any marathons just yet" per patient    She was compliant with home exercise program.    Pain: 0/10  Location: right knee    Objective     Rosalia received therapeutic exercises to develop strength for 30 minutes including:  Nu Step x 6 min  Slant board 2 x 30 seconds with quad sets  SAQ 3 x 10 x 1.5 LB  SLR 3 x 10  Theraball rollouts with 5 sh in knee flexion and 5 sh in knee extension x 4 minutes  Progressive heel slides x 3 sets with 3 pull backs and 5 AP each pull back  LAQ 3 x 10  Hip Hike 2 x 10 (not today)  2" lateral step down 2 x 10 (not today)  Chair squats 2 x 10  Chair flexion stretch 2 x 30"       Rosalia participated in neuromuscular re-education activities to improve: Balance for 10 minutes. The following activities were included:  SLS on foam pad x 3  Side steps on foam beam x 3 laps  A-P tandem walking on foam beam x 3 laps    Active ROM as follows: 0 - 105 degrees of knee flexion (measured in supine) with no extensor lag    Home " Exercises Provided and Patient Education Provided     Education provided: no new exercises added today     Written Home Exercises Provided: Patient instructed to cont prior HEP.  Exercises were reviewed and Rosalia was able to demonstrate them prior to the end of the session.  Rosalia demonstrated good  understanding of the education provided.     See EMR under Patient Instructions for exercises provided prior visit.    Assessment     Patient tolerated the addition of all exercises with no complaints of pain. Patient required verbal and tactile cues  in order to facilitate weight shift and quadriceps strength ing with balance activities. MD d/c order for immobilizer and ROM restrictions.    Rosalia Is progressing well towards her goals.   Pt prognosis is Good.     Pt will continue to benefit from skilled outpatient physical therapy to address the deficits listed in the problem list box on initial evaluation, provide pt/family education and to maximize pt's level of independence in the home and community environment.     Pt's spiritual, cultural and educational needs considered and pt agreeable to plan of care and goals.     Anticipated barriers to physical therapy: none     Goals:    SHORT TERM GOALS: 4 weeks  Progress   Recent signs and systems trend is improving in order to progress towards LTG's.     Patient will improve Knee AROM to 0- 0-  in order to progress towards functional activities.     Patient will Demonstrate 0* quad lag with Straight leg raise in long sit.      Patient will be independent with HEP in order to further progress and return to maximal function.     Pain rating at Worst: 2/10 in order to progress towards increased independence with activity.     Patient will correct postural deviations in sitting and standing, to decrease pain and promote long term stability.        LONG TERM GOALS: 8 weeks  Progress   Patient will return to normal ADL, recreational, and work related activities with  less pain and limitation.      Patient will improve AROM 0- 0- 125 to return to maximal functional potential.      Patient will improve Strength to 5 /5 in order to improve functional independence.      Patient will improve post-op edema girth to sound leg - for overall mechanics of the joint for muscle activation..     Pain Rating at Best: 1/10 to improve Quality of Life.      Patient will meet predicted functional outcome (FOTO) score: 10% to increase self-worth & perceived functional ability.      Plan     Continue with appropriate POC    Richard Multani PTA  9/23/2022

## 2022-09-27 ENCOUNTER — CLINICAL SUPPORT (OUTPATIENT)
Dept: REHABILITATION | Facility: HOSPITAL | Age: 32
End: 2022-09-27
Payer: COMMERCIAL

## 2022-09-27 DIAGNOSIS — S83.241D ACUTE MEDIAL MENISCUS TEAR OF RIGHT KNEE, SUBSEQUENT ENCOUNTER: Primary | ICD-10-CM

## 2022-09-27 DIAGNOSIS — M25.361 PATELLAR INSTABILITY OF RIGHT KNEE: ICD-10-CM

## 2022-09-27 PROCEDURE — 97112 NEUROMUSCULAR REEDUCATION: CPT | Mod: CQ

## 2022-09-27 PROCEDURE — 97110 THERAPEUTIC EXERCISES: CPT | Mod: CQ

## 2022-09-27 NOTE — PROGRESS NOTES
"  Physical Therapy Treatment Note     Name: Rosalia Toth  [facility] Number: [id number]    Therapy Diagnosis:   Encounter Diagnoses   Name Primary?    Acute medial meniscus tear of right knee, subsequent encounter Yes    Patellar instability of right knee        Physician: Zelalem Irby MD    Visit Date: 9/27/2022    Physician Orders: PT Eval and Treat  Medical Diagnosis from Referral: Acute pain of right knee [M25.561], Acute medial meniscus tear of right knee, initial encounter [S83.241A]  Evaluation Date: 8/30/2022  Authorization Period Expiration: 2023  Plan of Care Expiration: 11/1/2022     Visit # / Visits authorized: 8/17   PTA Visit #: 2    Time In: 1013  Time Out: 1051  Total Billable Time: 38 minutes    Precautions: Standard and orthopedic   PRECAUTIONS: Standard Precautions       Subjective     Pt reports: her knee is feeling okay this morning    She was compliant with home exercise program.    Pain: 0/10  Location: right knee    Objective     Rosalia received therapeutic exercises to develop strength for 28 minutes including:  Nu Step x 6 min  Slant board 2 x 30 seconds with quad sets  SAQ 3 x 10 x 1.5 LB  SLR 3 x 10 x 1.5 LB  Theraball rollouts with 5 sh in knee flexion and 5 sh in knee extension x 4 minutes  Progressive heel slides x 3 sets with 3 pull backs and 5 AP each pull back  LAQ 3 x 10 x 1.5 LB  Hip Hike 2 x 10 (not today)  2" lateral step down 2 x 10  Chair squats 3 x 10  Chair flexion stretch 2 x 30"       Rosalia participated in neuromuscular re-education activities to improve: Balance for 10 minutes. The following activities were included:  SLS on foam pad x 3  Side steps on foam beam x 3 laps  A-P tandem walking on foam beam x 3 laps    Active ROM as follows: 0 - 105 degrees of knee flexion (measured in supine) with no extensor lag    Home Exercises Provided and Patient Education Provided     Education provided: no new exercises added today     Written Home Exercises Provided: Patient " instructed to cont prior HEP.  Exercises were reviewed and Rosalia was able to demonstrate them prior to the end of the session.  Rosalia demonstrated good  understanding of the education provided.     See EMR under Patient Instructions for exercises provided prior visit.    Assessment     Patient tolerated the addition of all exercises with no complaints of pain. Patient required verbal and tactile cues  in order to facilitate weight shift and quadriceps strength ing with balance activities.    Rosalia Is progressing well towards her goals.   Pt prognosis is Good.     Pt will continue to benefit from skilled outpatient physical therapy to address the deficits listed in the problem list box on initial evaluation, provide pt/family education and to maximize pt's level of independence in the home and community environment.     Pt's spiritual, cultural and educational needs considered and pt agreeable to plan of care and goals.     Anticipated barriers to physical therapy: none     Goals:    SHORT TERM GOALS: 4 weeks  Progress   Recent signs and systems trend is improving in order to progress towards LTG's.     Patient will improve Knee AROM to 0- 0-  in order to progress towards functional activities.     Patient will Demonstrate 0* quad lag with Straight leg raise in long sit.      Patient will be independent with HEP in order to further progress and return to maximal function.     Pain rating at Worst: 2/10 in order to progress towards increased independence with activity.     Patient will correct postural deviations in sitting and standing, to decrease pain and promote long term stability.        LONG TERM GOALS: 8 weeks  Progress   Patient will return to normal ADL, recreational, and work related activities with less pain and limitation.      Patient will improve AROM 0- 0- 125 to return to maximal functional potential.      Patient will improve Strength to 5 /5 in order to improve functional independence.       Patient will improve post-op edema girth to sound leg - for overall mechanics of the joint for muscle activation..     Pain Rating at Best: 1/10 to improve Quality of Life.      Patient will meet predicted functional outcome (FOTO) score: 10% to increase self-worth & perceived functional ability.      Plan     Continue with appropriate POC    Richard Multani, LAURO  9/27/2022

## 2022-09-30 ENCOUNTER — CLINICAL SUPPORT (OUTPATIENT)
Dept: REHABILITATION | Facility: HOSPITAL | Age: 32
End: 2022-09-30
Payer: COMMERCIAL

## 2022-09-30 DIAGNOSIS — S83.241D ACUTE MEDIAL MENISCUS TEAR OF RIGHT KNEE, SUBSEQUENT ENCOUNTER: Primary | ICD-10-CM

## 2022-09-30 DIAGNOSIS — M25.361 PATELLAR INSTABILITY OF RIGHT KNEE: ICD-10-CM

## 2022-09-30 PROCEDURE — 97110 THERAPEUTIC EXERCISES: CPT | Mod: CQ

## 2022-09-30 PROCEDURE — 97112 NEUROMUSCULAR REEDUCATION: CPT | Mod: CQ

## 2022-09-30 NOTE — PROGRESS NOTES
"  Physical Therapy Treatment Note     Name: Rosalia Toth  [facility] Number: [id number]    Therapy Diagnosis:   Encounter Diagnoses   Name Primary?    Acute medial meniscus tear of right knee, subsequent encounter Yes    Patellar instability of right knee        Physician: Zelalem Irby MD    Visit Date: 9/30/2022    Physician Orders: PT Eval and Treat  Medical Diagnosis from Referral: Acute pain of right knee [M25.561], Acute medial meniscus tear of right knee, initial encounter [S83.241A]  Evaluation Date: 8/30/2022  Authorization Period Expiration: 2023  Plan of Care Expiration: 11/1/2022     Visit # / Visits authorized: 9/17   PTA Visit #: 3    Time In: 1011  Time Out: 1041  Total Billable Time: 30 minutes    Precautions: Standard and orthopedic   PRECAUTIONS: Standard Precautions       Subjective     Pt reports: her knee is feeling about the same    She was compliant with home exercise program.    Pain: 0/10  Location: right knee    Objective     Rosalia received therapeutic exercises to develop strength for 22 minutes including:  Nu Step x 6 min  Slant board 2 x 30 seconds with quad sets  SAQ 3 x 10 x 2 LB  SLR 3 x 10 x 1.5 LB  Theraball rollouts with 5 sh in knee flexion and 5 sh in knee extension x 4 minutes  Progressive heel slides x 5 sets with 3 pull backs and 5 AP each pull back  LAQ 3 x 10 x 2 LB  2" lateral step down 2 x 10  Chair squats 3 x 10  Chair flexion stretch 2 x 30" (not today)       Rosalia participated in neuromuscular re-education activities to improve: Balance for 8 minutes. The following activities were included:  SLS on foam pad x 3  Side steps on foam beam x 4 laps  A-P tandem walking on foam beam x 3 laps    Active ROM as follows: 0 - 113 degrees of knee flexion (measured in supine) with no extensor lag    Home Exercises Provided and Patient Education Provided     Education provided: no new exercises added today     Written Home Exercises Provided: Patient instructed to cont prior " HEP.  Exercises were reviewed and Rosalia was able to demonstrate them prior to the end of the session.  Rosalia demonstrated good  understanding of the education provided.     See EMR under Patient Instructions for exercises provided prior visit.    Assessment     Patient tolerated the addition of all exercises with no complaints of pain. Patient required verbal and tactile cues  in order to facilitate weight shift and quadriceps strength ing with balance activities.    Rosalia Is progressing well towards her goals.   Pt prognosis is Good.     Pt will continue to benefit from skilled outpatient physical therapy to address the deficits listed in the problem list box on initial evaluation, provide pt/family education and to maximize pt's level of independence in the home and community environment.     Pt's spiritual, cultural and educational needs considered and pt agreeable to plan of care and goals.     Anticipated barriers to physical therapy: none     Goals:    SHORT TERM GOALS: 4 weeks  Progress   Recent signs and systems trend is improving in order to progress towards LTG's. PC    Patient will improve Knee AROM to 0- 0-  in order to progress towards functional activities. PC    Patient will Demonstrate 0* quad lag with Straight leg raise in long sit.  M    Patient will be independent with HEP in order to further progress and return to maximal function.  PC   Pain rating at Worst: 2/10 in order to progress towards increased independence with activity. M    Patient will correct postural deviations in sitting and standing, to decrease pain and promote long term stability. PC       LONG TERM GOALS: 8 weeks  Progress   Patient will return to normal ADL, recreational, and work related activities with less pain and limitation.      Patient will improve AROM 0- 0- 125 to return to maximal functional potential.      Patient will improve Strength to 5 /5 in order to improve functional independence.      Patient will  improve post-op edema girth to sound leg - for overall mechanics of the joint for muscle activation..     Pain Rating at Best: 1/10 to improve Quality of Life.      Patient will meet predicted functional outcome (FOTO) score: 10% to increase self-worth & perceived functional ability.      Plan     Continue with appropriate POC    Richard Multani, LAURO  9/30/2022

## 2022-10-04 ENCOUNTER — CLINICAL SUPPORT (OUTPATIENT)
Dept: REHABILITATION | Facility: HOSPITAL | Age: 32
End: 2022-10-04
Payer: COMMERCIAL

## 2022-10-04 DIAGNOSIS — S83.241D ACUTE MEDIAL MENISCUS TEAR OF RIGHT KNEE, SUBSEQUENT ENCOUNTER: Primary | ICD-10-CM

## 2022-10-04 DIAGNOSIS — M25.361 PATELLAR INSTABILITY OF RIGHT KNEE: ICD-10-CM

## 2022-10-04 PROCEDURE — 97112 NEUROMUSCULAR REEDUCATION: CPT | Mod: CQ

## 2022-10-04 PROCEDURE — 97110 THERAPEUTIC EXERCISES: CPT | Mod: CQ

## 2022-10-04 NOTE — PROGRESS NOTES
"  Physical Therapy Treatment Note     Name: Rosalia Toth  Clinic Number: [id number]    Therapy Diagnosis:   No diagnosis found.      Physician: Zelalem Irby MD    Visit Date: 10/4/2022    Physician Orders: PT Eval and Treat  Medical Diagnosis from Referral: Acute pain of right knee [M25.561], Acute medial meniscus tear of right knee, initial encounter [S83.241A]  Evaluation Date: 8/30/2022  Authorization Period Expiration: 2023  Plan of Care Expiration: 11/1/2022     Visit # / Visits authorized: 10/17   PTA Visit #: 4    Time In: 1013 (Pt tx started prior to being checked in)  Time Out: 1051  Total Billable Time: 38 minutes    Precautions: Standard and orthopedic   PRECAUTIONS: Standard Precautions       Subjective     Pt reports: her knee is feeling about the same    She was compliant with home exercise program.    Pain: 0/10  Location: right knee    Objective     Rosalia received therapeutic exercises to develop strength for 28 minutes including:  Nu Step x 6 min  Slant board 2 x 30 seconds with quad sets  SAQ 3 x 10 x 2 LB  SLR 3 x 10 x 2 LB  DKTC with theraball 3 x 10 with 5 sh in knee flexion and extension  Progressive heel slides x 5 sets with 3 pull backs and 5 AP each pull back  LAQ 3 x 10 x 2 LB  2" lateral step down 3 x 10  Chair squats 3 x 10  Chair flexion stretch 2 x 30"       Rosalia participated in neuromuscular re-education activities to improve: Balance for 10 minutes. The following activities were included:  SLS on foam pad with tramp toss  Step up with march on bosu 2 x 10  Side steps on foam beam x 4 laps  A-P tandem walking on foam beam x 4 laps    Active ROM as follows: 0 - 113 degrees of knee flexion (measured in supine) with no extensor lag    Home Exercises Provided and Patient Education Provided     Education provided: no new exercises added today     Written Home Exercises Provided: Patient instructed to cont prior HEP.  Exercises were reviewed and Rosalia was able to demonstrate " them prior to the end of the session.  Rosalia demonstrated good  understanding of the education provided.     See EMR under Patient Instructions for exercises provided prior visit.    Assessment     Patient tolerated the addition of all exercises and balance activities with no complaints of pain.    Rosalia Is progressing well towards her goals.   Pt prognosis is Good.     Pt will continue to benefit from skilled outpatient physical therapy to address the deficits listed in the problem list box on initial evaluation, provide pt/family education and to maximize pt's level of independence in the home and community environment.     Pt's spiritual, cultural and educational needs considered and pt agreeable to plan of care and goals.     Anticipated barriers to physical therapy: none     Goals:    SHORT TERM GOALS: 4 weeks  Progress   Recent signs and systems trend is improving in order to progress towards LTG's. PC    Patient will improve Knee AROM to 0- 0-  in order to progress towards functional activities. PC    Patient will Demonstrate 0* quad lag with Straight leg raise in long sit.  M    Patient will be independent with HEP in order to further progress and return to maximal function.  PC   Pain rating at Worst: 2/10 in order to progress towards increased independence with activity. M    Patient will correct postural deviations in sitting and standing, to decrease pain and promote long term stability. PC       LONG TERM GOALS: 8 weeks  Progress   Patient will return to normal ADL, recreational, and work related activities with less pain and limitation.      Patient will improve AROM 0- 0- 125 to return to maximal functional potential.      Patient will improve Strength to 5 /5 in order to improve functional independence.      Patient will improve post-op edema girth to sound leg - for overall mechanics of the joint for muscle activation..     Pain Rating at Best: 1/10 to improve Quality of Life.      Patient  will meet predicted functional outcome (FOTO) score: 10% to increase self-worth & perceived functional ability.      Plan     Continue with appropriate POC    Richard Multani PTA  10/4/2022

## 2022-10-07 ENCOUNTER — CLINICAL SUPPORT (OUTPATIENT)
Dept: REHABILITATION | Facility: HOSPITAL | Age: 32
End: 2022-10-07
Payer: COMMERCIAL

## 2022-10-07 DIAGNOSIS — M25.361 PATELLAR INSTABILITY OF RIGHT KNEE: Primary | ICD-10-CM

## 2022-10-07 PROCEDURE — 97110 THERAPEUTIC EXERCISES: CPT | Mod: CQ

## 2022-10-07 PROCEDURE — 97112 NEUROMUSCULAR REEDUCATION: CPT | Mod: CQ

## 2022-10-07 NOTE — PROGRESS NOTES
"  Physical Therapy Treatment Note     Name: Rosalia Toth  Clinic Number: [id number]    Therapy Diagnosis:   No diagnosis found.      Physician: Zelalem Irby MD    Visit Date: 10/7/2022    Physician Orders: PT Eval and Treat  Medical Diagnosis from Referral: Acute pain of right knee [M25.561], Acute medial meniscus tear of right knee, initial encounter [S83.241A]  Evaluation Date: 8/30/2022  Authorization Period Expiration: 2023  Plan of Care Expiration: 11/1/2022     Visit # / Visits authorized: 11/17   PTA Visit #: 5    Time In: 1017  Time Out: 1050  Total Billable Time: 33 minutes    Precautions: Standard and orthopedic     Subjective     Pt reports: her knee is feeling about the same    She was compliant with home exercise program.    Pain: 0/10  Location: right knee    Objective     Rosalia received therapeutic exercises to develop strength for 23 minutes including:  Nu Step x 6 min  Slant board 2 x 30 seconds with quad sets  SAQ 3 x 10 x 2 LB  SLR 3 x 10 x 2 LB  DKTC with theraball 3 x 10 with 5 sh in knee flexion and extension  Progressive heel slides x 5 sets with 3 pull backs and 5 AP each pull back  LAQ 3 x 10 x 2 LB  2" lateral step down 3 x 10  Chair squats 3 x 10  Chair flexion stretch 2 x 30"    Rosalia participated in neuromuscular re-education activities to improve: Balance for 10 minutes. The following activities were included:  SLS on foam pad with tramp toss  Step up with march on bosu 2 x 10  Side steps on foam beam x 4 laps  A-P tandem walking on foam beam x 4 laps    Active ROM as follows: 0 - 113 degrees of knee flexion (measured in supine) with no extensor lag    Home Exercises Provided and Patient Education Provided     Education provided: no new exercises added today     Written Home Exercises Provided: Patient instructed to cont prior HEP.  Exercises were reviewed and Rosalia was able to demonstrate them prior to the end of the session.  Rosalia demonstrated good  understanding of " the education provided.     See EMR under Patient Instructions for exercises provided prior visit.    Assessment     Patient tolerated the addition of all exercises and balance activities with no complaints of pain.    Rosalia Is progressing well towards her goals.   Pt prognosis is Good.     Pt will continue to benefit from skilled outpatient physical therapy to address the deficits listed in the problem list box on initial evaluation, provide pt/family education and to maximize pt's level of independence in the home and community environment.     Pt's spiritual, cultural and educational needs considered and pt agreeable to plan of care and goals.     Anticipated barriers to physical therapy: none     Goals:    SHORT TERM GOALS: 4 weeks  Progress   Recent signs and systems trend is improving in order to progress towards LTG's. PC    Patient will improve Knee AROM to 0- 0-  in order to progress towards functional activities. PC    Patient will Demonstrate 0* quad lag with Straight leg raise in long sit.  M    Patient will be independent with HEP in order to further progress and return to maximal function.  PC   Pain rating at Worst: 2/10 in order to progress towards increased independence with activity. M    Patient will correct postural deviations in sitting and standing, to decrease pain and promote long term stability. PC       LONG TERM GOALS: 8 weeks  Progress   Patient will return to normal ADL, recreational, and work related activities with less pain and limitation.      Patient will improve AROM 0- 0- 125 to return to maximal functional potential.      Patient will improve Strength to 5 /5 in order to improve functional independence.      Patient will improve post-op edema girth to sound leg - for overall mechanics of the joint for muscle activation..     Pain Rating at Best: 1/10 to improve Quality of Life.      Patient will meet predicted functional outcome (FOTO) score: 10% to increase self-worth &  perceived functional ability.      Plan     Continue with appropriate POC    Estrella Badillo, PTA  10/7/2022

## 2022-10-18 ENCOUNTER — CLINICAL SUPPORT (OUTPATIENT)
Dept: REHABILITATION | Facility: HOSPITAL | Age: 32
End: 2022-10-18
Payer: COMMERCIAL

## 2022-10-18 PROCEDURE — 97110 THERAPEUTIC EXERCISES: CPT

## 2022-10-18 NOTE — PROGRESS NOTES
Physical Therapy Discharge.  Note     Name: Rosalia Toth  [facility] Number: [id number]    Therapy Diagnosis:   No diagnosis found.    Physician: Zelalem Irby MD    Visit Date: 10/18/2022    Physician Orders: PT Eval and Treat  Medical Diagnosis from Referral: Acute pain of right knee [M25.561], Acute medial meniscus tear of right knee, initial encounter [S83.241A]  Evaluation Date: 8/30/2022  Authorization Period Expiration: 2023  Plan of Care Expiration: 11/1/2022     Visit # / Visits authorized: 12/17   PTA Visit #: 0    Time In: 11:01 PM  Time Out: 11: 33 PM  Total Billable Time: 32 minutes    Precautions: Standard and orthopedic     Subjective     Pt reports: her knee is feeling about the same; patient reports she is able to put on shoes that she was not able to before the surgery.    She was compliant with home exercise program.    Pain: 0/10  Location: right knee    Objective     GIRTH/EDEMA:      KNEE    3 in Superior (cm) Mid Patella  (cm) 6 in Inferior  (cm) GOALS  (cm)   RIGHT 17.5  17.5     No goal   LEFT 19  18     No goal      RANGE OF MOTION:     Knee AROM/PROM Right    Left    Goal   Hyper - Zero - Flexion (PRE)     0-125  0-125 0-0-135  Initial: ext - 0          STRENGTH:       Right    Left    Pain/Dysfunction with Movement Goal   QUAD LAG (deg) 0 degrees   0 With straight leg raise in long seated position 0*      L/E MMT Right Goal   Hip Flexion  5/5 5/5 B    Hip Extension  5/5 5/5 B   Hip Abduction  5/5 5/5 B   Hip IR 5/5 5/5 B   Hip ER 5/5 5/5 B   Knee Flexion 5/5 5/5 B   Knee Extension 5/5 5/5 B   Ankle DF 5/5 5/5 B   Ankle PF 5/5 5/5 B   Ankle Inversion 5/5 5/5 B   Ankle Eversion 5/5 5/5 B      L/E MMT Left Goal   Hip Flexion  5/5 5/5 B    Hip Extension  5/5 5/5 B   Hip Abduction  5/5 5/5 B   Hip IR 5/5 5/5 B   Hip ER 5/5 5/5 B   Knee Flexion 5/5 5/5 B   Knee Extension 5/5 5/5 B   Ankle DF 5/5 5/5 B   Ankle PF 5/5 5/5 B   Ankle Inversion 5/5 5/5 B   Ankle Eversion 5/5 5/5 B         MUSCLE  "LENGTH:      Muscle Tested  Right Left  Goal   Hamstrings  normal normal Normal B   Gastrocnemius  Normal  normal Normal B   Soleus  normal normal Normal B      JOINT MOBILITY:      Joint Motion Tested Right    Left     Goal   Patellar Glides: Superior Normal Normal Normal B   Patellar Glides: Inferior Normal Normal Normal B   Patellar Glides: Medical Normal Normal Normal B   Patellar Tilts Normal Normal Normal B        SLS surgical le seconds no sway     SPECIAL TESTS: not tested due to post-operative state.     Sensation:  Sensation is intact to light touch     Palpation: Increased tone and tenderness noted with palpation to: medial quad tendon right knee     Posture:  Pt presents with postural abnormalities which include: forward head and rounded shoulders     Gait Analysis: The patient ambulated with the following assistive device: none; the pt presents with the following gait abnormalities: decreased knee flexion     FUNCTION:      CMS Impairment/Limitation/Restriction for FOTO KNEE Survey     Therapist reviewed FOTO scores for Rosalia Toth on 2022.   FOTO documents entered into [email or web address] - see Media section.     Limitation Score: 65%     DC= 94%             Rosalia received therapeutic exercises to develop strength for 23 minutes including:  Nu Step x 6 min  Slant board 2 x 30 seconds with quad sets (not today)  SAQ 3 x 10 x 2 LB  (Not today)  SLR 3 x 10 x 2 LB (Not today)  DKTC with theraball 3 x 10 with 5 sh in knee flexion and extension (Not today)  Progressive heel slides x 5 sets with 3 pull backs and 5 AP each pull back (Not today)  LAQ 3 x 10 x 5 LB  Lateral Walk with red band  2" lateral step down 2 x 10  Mini Squat 3 x 10  Bridges   Hip Hike    Rosalia participated in neuromuscular re-education activities to improve: Balance for 0 minutes. The following activities were included:  SLS on foam pad with tramp toss  Step up with march on bosu 2 x 10  Side steps on foam beam x 4 laps  A-P tandem " walking on foam beam x 4 laps        Home Exercises Provided and Patient Education Provided     Education provided: no new exercises added today     Written Home Exercises Provided: Patient instructed to cont prior HEP.  Exercises were reviewed and Rosalia was able to demonstrate them prior to the end of the session.  Rosalia demonstrated good  understanding of the education provided.     See EMR under Patient Instructions for exercises provided prior visit.    Assessment     Rosalia Toth present to physical therapy today for final assessment an discharge.  Rosalia has met all goals set at initial evaluation, unless listed below, and will continue to work at home towards personal goal(s).  Rosalia is independent with home exercise program and was given handouts throughout this episode of care to reference for continued wellness and physical fitness . Contact information was given to patient in case any questions arise in the future or if therapy is needed.      Discharge reason: Patient has met all of his/her goals and Patient has reached the maximum rehab potential for the present time    Discharge FOTO Score: 94%     Goals:    SHORT TERM GOALS: 4 weeks  Progress   Recent signs and systems trend is improving in order to progress towards LTG's. M   Patient will improve Knee AROM to 0- 0-  in order to progress towards functional activities. M   Patient will Demonstrate 0* quad lag with Straight leg raise in long sit.  M    Patient will be independent with HEP in order to further progress and return to maximal function.  M   Pain rating at Worst: 2/10 in order to progress towards increased independence with activity. M    Patient will correct postural deviations in sitting and standing, to decrease pain and promote long term stability. M       LONG TERM GOALS: 8 weeks  Progress   Patient will return to normal ADL, recreational, and work related activities with less pain and limitation.  M    Patient will  improve AROM 0- 0- 125 to return to maximal functional potential.  M    Patient will improve Strength to 5 /5 in order to improve functional independence.  M    Patient will improve post-op edema girth to sound leg - for overall mechanics of the joint for muscle activation.. M    Pain Rating at Best: 1/10 to improve Quality of Life.  M    Patient will meet predicted functional outcome (FOTO) score: 10% to increase self-worth & perceived functional ability.       M     Plan     DC with HEP.     Sunil Ellison, PT  10/18/2022

## 2022-10-18 NOTE — PLAN OF CARE
Physical Therapy Discharge.  Note     Name: Rosalia Toth  [facility] Number: [id number]    Therapy Diagnosis:   No diagnosis found.    Physician: Zelalem Irby MD    Visit Date: 10/18/2022    Physician Orders: PT Eval and Treat  Medical Diagnosis from Referral: Acute pain of right knee [M25.561], Acute medial meniscus tear of right knee, initial encounter [S83.241A]  Evaluation Date: 8/30/2022  Authorization Period Expiration: 2023  Plan of Care Expiration: 11/1/2022     Visit # / Visits authorized: 12/17   PTA Visit #: 0    Time In: 11:01 PM  Time Out: 11: 33 PM  Total Billable Time: 32 minutes    Precautions: Standard and orthopedic     Subjective     Pt reports: her knee is feeling about the same; patient reports she is able to put on shoes that she was not able to before the surgery.    She was compliant with home exercise program.    Pain: 0/10  Location: right knee    Objective     GIRTH/EDEMA:      KNEE    3 in Superior (cm) Mid Patella  (cm) 6 in Inferior  (cm) GOALS  (cm)   RIGHT 17.5  17.5     No goal   LEFT 19  18     No goal      RANGE OF MOTION:     Knee AROM/PROM Right    Left    Goal   Hyper - Zero - Flexion (PRE)     0-125  0-125 0-0-135  Initial: ext - 0          STRENGTH:       Right    Left    Pain/Dysfunction with Movement Goal   QUAD LAG (deg) 0 degrees   0 With straight leg raise in long seated position 0*      L/E MMT Right Goal   Hip Flexion  5/5 5/5 B    Hip Extension  5/5 5/5 B   Hip Abduction  5/5 5/5 B   Hip IR 5/5 5/5 B   Hip ER 5/5 5/5 B   Knee Flexion 5/5 5/5 B   Knee Extension 5/5 5/5 B   Ankle DF 5/5 5/5 B   Ankle PF 5/5 5/5 B   Ankle Inversion 5/5 5/5 B   Ankle Eversion 5/5 5/5 B      L/E MMT Left Goal   Hip Flexion  5/5 5/5 B    Hip Extension  5/5 5/5 B   Hip Abduction  5/5 5/5 B   Hip IR 5/5 5/5 B   Hip ER 5/5 5/5 B   Knee Flexion 5/5 5/5 B   Knee Extension 5/5 5/5 B   Ankle DF 5/5 5/5 B   Ankle PF 5/5 5/5 B   Ankle Inversion 5/5 5/5 B   Ankle Eversion 5/5 5/5 B         MUSCLE  "LENGTH:      Muscle Tested  Right Left  Goal   Hamstrings  normal normal Normal B   Gastrocnemius  Normal  normal Normal B   Soleus  normal normal Normal B      JOINT MOBILITY:      Joint Motion Tested Right    Left     Goal   Patellar Glides: Superior Normal Normal Normal B   Patellar Glides: Inferior Normal Normal Normal B   Patellar Glides: Medical Normal Normal Normal B   Patellar Tilts Normal Normal Normal B        SLS surgical le seconds no sway     SPECIAL TESTS: not tested due to post-operative state.     Sensation:  Sensation is intact to light touch     Palpation: Increased tone and tenderness noted with palpation to: medial quad tendon right knee     Posture:  Pt presents with postural abnormalities which include: forward head and rounded shoulders     Gait Analysis: The patient ambulated with the following assistive device: none; the pt presents with the following gait abnormalities: decreased knee flexion     FUNCTION:      CMS Impairment/Limitation/Restriction for FOTO KNEE Survey     Therapist reviewed FOTO scores for Rosalia Toth on 2022.   FOTO documents entered into [employer] - see Media section.     Limitation Score: 65%     DC= 94%             Rosalia received therapeutic exercises to develop strength for 23 minutes including:  Nu Step x 6 min  Slant board 2 x 30 seconds with quad sets (not today)  SAQ 3 x 10 x 2 LB  (Not today)  SLR 3 x 10 x 2 LB (Not today)  DKTC with theraball 3 x 10 with 5 sh in knee flexion and extension (Not today)  Progressive heel slides x 5 sets with 3 pull backs and 5 AP each pull back (Not today)  LAQ 3 x 10 x 5 LB  Lateral Walk with red band  2" lateral step down 2 x 10  Mini Squat 3 x 10  Bridges   Hip Hike    Rosalia participated in neuromuscular re-education activities to improve: Balance for 0 minutes. The following activities were included:  SLS on foam pad with tramp toss  Step up with march on bosu 2 x 10  Side steps on foam beam x 4 laps  A-P tandem " walking on foam beam x 4 laps        Home Exercises Provided and Patient Education Provided     Education provided: no new exercises added today     Written Home Exercises Provided: Patient instructed to cont prior HEP.  Exercises were reviewed and Rosalia was able to demonstrate them prior to the end of the session.  Rosalia demonstrated good  understanding of the education provided.     See EMR under Patient Instructions for exercises provided prior visit.    Assessment     Rosalia Toth present to physical therapy today for final assessment an discharge.  Rosalia has met all goals set at initial evaluation, unless listed below, and will continue to work at home towards personal goal(s).  Rosalia is independent with home exercise program and was given handouts throughout this episode of care to reference for continued wellness and physical fitness . Contact information was given to patient in case any questions arise in the future or if therapy is needed.      Discharge reason: Patient has met all of his/her goals and Patient has reached the maximum rehab potential for the present time    Discharge FOTO Score: 94%     Goals:    SHORT TERM GOALS: 4 weeks  Progress   Recent signs and systems trend is improving in order to progress towards LTG's. M   Patient will improve Knee AROM to 0- 0-  in order to progress towards functional activities. M   Patient will Demonstrate 0* quad lag with Straight leg raise in long sit.  M    Patient will be independent with HEP in order to further progress and return to maximal function.  M   Pain rating at Worst: 2/10 in order to progress towards increased independence with activity. M    Patient will correct postural deviations in sitting and standing, to decrease pain and promote long term stability. M       LONG TERM GOALS: 8 weeks  Progress   Patient will return to normal ADL, recreational, and work related activities with less pain and limitation.  M    Patient will  improve AROM 0- 0- 125 to return to maximal functional potential.  M    Patient will improve Strength to 5 /5 in order to improve functional independence.  M    Patient will improve post-op edema girth to sound leg - for overall mechanics of the joint for muscle activation.. M    Pain Rating at Best: 1/10 to improve Quality of Life.  M    Patient will meet predicted functional outcome (FOTO) score: 10% to increase self-worth & perceived functional ability.       M     Plan     DC with HEP.     Sunil Ellison, PT  10/18/2022

## 2023-10-22 ENCOUNTER — HOSPITAL ENCOUNTER (EMERGENCY)
Facility: HOSPITAL | Age: 33
Discharge: HOME | End: 2023-10-22
Payer: COMMERCIAL

## 2023-10-22 VITALS
SYSTOLIC BLOOD PRESSURE: 106 MMHG | HEIGHT: 65 IN | WEIGHT: 222 LBS | BODY MASS INDEX: 36.99 KG/M2 | HEART RATE: 60 BPM | RESPIRATION RATE: 17 BRPM | TEMPERATURE: 98 F | DIASTOLIC BLOOD PRESSURE: 60 MMHG | OXYGEN SATURATION: 98 %

## 2023-10-22 DIAGNOSIS — R10.9 FLANK PAIN: ICD-10-CM

## 2023-10-22 DIAGNOSIS — N28.1 RENAL CYSTS, ACQUIRED, BILATERAL: Primary | ICD-10-CM

## 2023-10-22 DIAGNOSIS — D72.829 LEUKOCYTOSIS, UNSPECIFIED TYPE: ICD-10-CM

## 2023-10-22 LAB
ALBUMIN SERPL BCP-MCNC: 3.9 G/DL (ref 3.5–5)
ALBUMIN/GLOB SERPL: 1.1 {RATIO}
ALP SERPL-CCNC: 82 U/L (ref 37–98)
ALT SERPL W P-5'-P-CCNC: 39 U/L (ref 13–56)
ANION GAP SERPL CALCULATED.3IONS-SCNC: 10 MMOL/L (ref 7–16)
AST SERPL W P-5'-P-CCNC: 25 U/L (ref 15–37)
BACTERIA #/AREA URNS HPF: ABNORMAL /HPF
BASOPHILS # BLD AUTO: 0.02 K/UL (ref 0–0.2)
BASOPHILS NFR BLD AUTO: 0.1 % (ref 0–1)
BILIRUB SERPL-MCNC: 0.4 MG/DL (ref ?–1.2)
BILIRUB UR QL STRIP: ABNORMAL
BUN SERPL-MCNC: 18 MG/DL (ref 7–18)
BUN/CREAT SERPL: 16 (ref 6–20)
CALCIUM SERPL-MCNC: 9.3 MG/DL (ref 8.5–10.1)
CHLORIDE SERPL-SCNC: 102 MMOL/L (ref 98–107)
CLARITY UR: ABNORMAL
CO2 SERPL-SCNC: 30 MMOL/L (ref 21–32)
COLOR UR: ABNORMAL
CREAT SERPL-MCNC: 1.12 MG/DL (ref 0.55–1.02)
DIFFERENTIAL METHOD BLD: ABNORMAL
EGFR (NO RACE VARIABLE) (RUSH/TITUS): 67 ML/MIN/1.73M2
EOSINOPHIL # BLD AUTO: 0.01 K/UL (ref 0–0.5)
EOSINOPHIL NFR BLD AUTO: 0.1 % (ref 1–4)
ERYTHROCYTE [DISTWIDTH] IN BLOOD BY AUTOMATED COUNT: 13 % (ref 11.5–14.5)
GLOBULIN SER-MCNC: 3.7 G/DL (ref 2–4)
GLUCOSE SERPL-MCNC: 127 MG/DL (ref 74–106)
GLUCOSE UR STRIP-MCNC: NEGATIVE MG/DL
HCG UR QL IA.RAPID: NEGATIVE
HCT VFR BLD AUTO: 42.2 % (ref 38–47)
HGB BLD-MCNC: 13.6 G/DL (ref 12–16)
KETONES UR STRIP-SCNC: ABNORMAL MG/DL
LEUKOCYTE ESTERASE UR QL STRIP: NEGATIVE
LYMPHOCYTES # BLD AUTO: 1.62 K/UL (ref 1–4.8)
LYMPHOCYTES NFR BLD AUTO: 10.4 % (ref 27–41)
MCH RBC QN AUTO: 29.2 PG (ref 27–31)
MCHC RBC AUTO-ENTMCNC: 32.2 G/DL (ref 32–36)
MCV RBC AUTO: 90.8 FL (ref 80–96)
MONOCYTES # BLD AUTO: 0.91 K/UL (ref 0–0.8)
MONOCYTES NFR BLD AUTO: 5.8 % (ref 2–6)
MPC BLD CALC-MCNC: 10.3 FL (ref 9.4–12.4)
NEUTROPHILS # BLD AUTO: 13.05 K/UL (ref 1.8–7.7)
NEUTROPHILS NFR BLD AUTO: 83.6 % (ref 53–65)
NITRITE UR QL STRIP: NEGATIVE
PH UR STRIP: 5.5 PH UNITS
PLATELET # BLD AUTO: 456 K/UL (ref 150–400)
POTASSIUM SERPL-SCNC: 4 MMOL/L (ref 3.5–5.1)
PROT SERPL-MCNC: 7.6 G/DL (ref 6.4–8.2)
PROT UR QL STRIP: 100
RBC # BLD AUTO: 4.65 M/UL (ref 4.2–5.4)
RBC # UR STRIP: ABNORMAL /UL
RBC #/AREA URNS HPF: ABNORMAL /HPF
SODIUM SERPL-SCNC: 138 MMOL/L (ref 136–145)
SP GR UR STRIP: 1.02
SQUAMOUS #/AREA URNS LPF: ABNORMAL /LPF
UROBILINOGEN UR STRIP-ACNC: 0.2 MG/DL
WBC # BLD AUTO: 15.61 K/UL (ref 4.5–11)
WBC #/AREA URNS HPF: ABNORMAL /HPF

## 2023-10-22 PROCEDURE — 25000003 PHARM REV CODE 250: Performed by: NURSE PRACTITIONER

## 2023-10-22 PROCEDURE — C9113 INJ PANTOPRAZOLE SODIUM, VIA: HCPCS | Performed by: NURSE PRACTITIONER

## 2023-10-22 PROCEDURE — 81001 URINALYSIS AUTO W/SCOPE: CPT | Performed by: NURSE PRACTITIONER

## 2023-10-22 PROCEDURE — 80053 COMPREHEN METABOLIC PANEL: CPT | Performed by: NURSE PRACTITIONER

## 2023-10-22 PROCEDURE — 25500020 PHARM REV CODE 255: Performed by: NURSE PRACTITIONER

## 2023-10-22 PROCEDURE — 96361 HYDRATE IV INFUSION ADD-ON: CPT

## 2023-10-22 PROCEDURE — 63600175 PHARM REV CODE 636 W HCPCS: Performed by: NURSE PRACTITIONER

## 2023-10-22 PROCEDURE — 99285 EMERGENCY DEPT VISIT HI MDM: CPT | Mod: 25

## 2023-10-22 PROCEDURE — 96365 THER/PROPH/DIAG IV INF INIT: CPT

## 2023-10-22 PROCEDURE — 85025 COMPLETE CBC W/AUTO DIFF WBC: CPT | Performed by: NURSE PRACTITIONER

## 2023-10-22 PROCEDURE — 99284 PR EMERGENCY DEPT VISIT,LEVEL IV: ICD-10-PCS | Mod: ,,, | Performed by: NURSE PRACTITIONER

## 2023-10-22 PROCEDURE — 96375 TX/PRO/DX INJ NEW DRUG ADDON: CPT

## 2023-10-22 PROCEDURE — 81025 URINE PREGNANCY TEST: CPT | Performed by: NURSE PRACTITIONER

## 2023-10-22 PROCEDURE — 99284 EMERGENCY DEPT VISIT MOD MDM: CPT | Mod: ,,, | Performed by: NURSE PRACTITIONER

## 2023-10-22 RX ORDER — ONDANSETRON 4 MG/1
4 TABLET, FILM COATED ORAL EVERY 6 HOURS PRN
Qty: 30 TABLET | Refills: 0 | Status: SHIPPED | OUTPATIENT
Start: 2023-10-22

## 2023-10-22 RX ORDER — ONDANSETRON 2 MG/ML
4 INJECTION INTRAMUSCULAR; INTRAVENOUS
Status: COMPLETED | OUTPATIENT
Start: 2023-10-22 | End: 2023-10-22

## 2023-10-22 RX ORDER — KETOROLAC TROMETHAMINE 10 MG/1
10 TABLET, FILM COATED ORAL EVERY 6 HOURS
Qty: 20 TABLET | Refills: 0 | Status: SHIPPED | OUTPATIENT
Start: 2023-10-22 | End: 2023-10-27

## 2023-10-22 RX ORDER — SULFAMETHOXAZOLE AND TRIMETHOPRIM 800; 160 MG/1; MG/1
1 TABLET ORAL 2 TIMES DAILY
Qty: 14 TABLET | Refills: 0 | Status: SHIPPED | OUTPATIENT
Start: 2023-10-22 | End: 2023-10-29

## 2023-10-22 RX ORDER — PANTOPRAZOLE SODIUM 40 MG/10ML
40 INJECTION, POWDER, LYOPHILIZED, FOR SOLUTION INTRAVENOUS
Status: COMPLETED | OUTPATIENT
Start: 2023-10-22 | End: 2023-10-22

## 2023-10-22 RX ADMIN — ONDANSETRON 4 MG: 2 INJECTION INTRAMUSCULAR; INTRAVENOUS at 09:10

## 2023-10-22 RX ADMIN — IOPAMIDOL 100 ML: 755 INJECTION, SOLUTION INTRAVENOUS at 10:10

## 2023-10-22 RX ADMIN — PANTOPRAZOLE SODIUM 40 MG: 40 INJECTION, POWDER, LYOPHILIZED, FOR SOLUTION INTRAVENOUS at 09:10

## 2023-10-22 RX ADMIN — SODIUM CHLORIDE 1000 ML: 9 INJECTION, SOLUTION INTRAVENOUS at 09:10

## 2023-10-22 RX ADMIN — CEFTRIAXONE SODIUM 2 G: 1 INJECTION, POWDER, FOR SOLUTION INTRAMUSCULAR; INTRAVENOUS at 09:10

## 2023-10-22 NOTE — ED PROVIDER NOTES
Encounter Date: 10/22/2023       History     Chief Complaint   Patient presents with    Flank Pain     Left flank pain     Presents to ED with c/o left flank pain and associated n/v since last night. Pain currently 3/10 but worse with movement. History of Polycystic Kidney Disease but has never had kidney stone---however, she is concerned about possible stone today. No recent illness otherwise. No fever / chills.     The history is provided by the patient.     Review of patient's allergies indicates:  No Known Allergies  Past Medical History:   Diagnosis Date    Polycystic kidney      Past Surgical History:   Procedure Laterality Date    ARTHROSCOPY OF KNEE Right 8/11/2022    Procedure: ARTHROSCOPY, KNEE;  Surgeon: Zelalem Irby MD;  Location: [facility] OR;  Service: Orthopedics;  Laterality: Right;    ARTHROSCOPY OF KNEE WITH ARTHROPLASTY OF PATELLOFEMORAL JOINT Right 8/11/2022    Procedure: ARTHROSCOPY, KNEE, WITH PATELLOFEMORAL RELEASE;  Surgeon: Zelalem Irby MD;  Location: [facility] OR;  Service: Orthopedics;  Laterality: Right;    KNEE ARTHROSCOPY W/ MENISCECTOMY Right 8/11/2022    Procedure: ARTHROSCOPY, KNEE, WITH MENISCECTOMY, PARTIAL MEDIAL AND LATERAL;  Surgeon: Zelalem Irby MD;  Location: [facility] OR;  Service: Orthopedics;  Laterality: Right;    PATELLA REALIGNMENT Right 8/11/2022    Procedure: ARTHROSCOPIC REALIGNMENT, PATELLA;  Surgeon: Zelalem Irby MD;  Location: [facility] OR;  Service: Orthopedics;  Laterality: Right;    SURGICAL REMOVAL OF MASS OF CHEEK       Family History   Problem Relation Age of Onset    Polycystic kidney disease Father     Hypertension Father      Social History     Tobacco Use    Smoking status: Never    Smokeless tobacco: Never   Substance Use Topics    Alcohol use: Never    Drug use: Never     Review of Systems   Constitutional:  Negative for fever.   Respiratory: Negative.     Cardiovascular: Negative.    Gastrointestinal:  Positive for  nausea and vomiting.   Genitourinary:  Positive for flank pain.   Musculoskeletal:  Positive for back pain.   Neurological: Negative.    All other systems reviewed and are negative.      Physical Exam     Initial Vitals [10/22/23 0853]   BP Pulse Resp Temp SpO2   117/72 70 18 98 °F (36.7 °C) 96 %      MAP       --         Physical Exam    Nursing note and vitals reviewed.  Constitutional: She appears well-developed and well-nourished.   HENT:   Head: Normocephalic and atraumatic.   Nose: Nose normal.   Mouth/Throat: Oropharynx is clear and moist.   Eyes: Conjunctivae and EOM are normal. Pupils are equal, round, and reactive to light.   Neck: Neck supple.   Normal range of motion.  Cardiovascular:  Normal rate, regular rhythm, normal heart sounds and intact distal pulses.           No murmur heard.  Pulmonary/Chest: Breath sounds normal.   Abdominal: Abdomen is soft. Bowel sounds are normal.   Left Flank Tenderness. No HSM. No palpable mass   Musculoskeletal:         General: Tenderness present. Normal range of motion.      Cervical back: Normal range of motion and neck supple.      Comments: Left CVA Tenderness      Neurological: She is alert and oriented to person, place, and time. She has normal strength. GCS score is 15. GCS eye subscore is 4. GCS verbal subscore is 5. GCS motor subscore is 6.   Skin: Skin is warm and dry. Capillary refill takes less than 2 seconds.   Psychiatric: She has a normal mood and affect. Her behavior is normal. Judgment and thought content normal.         Medical Screening Exam   See Full Note    ED Course   Procedures  Labs Reviewed   COMPREHENSIVE METABOLIC PANEL - Abnormal; Notable for the following components:       Result Value    Glucose 127 (*)     Creatinine 1.12 (*)     All other components within normal limits   URINALYSIS - Abnormal; Notable for the following components:    Protein,  (*)     Bilirubin, UA Small (*)     Blood, UA Large (*)     All other components within  normal limits   CBC WITH DIFFERENTIAL - Abnormal; Notable for the following components:    WBC 15.61 (*)     Platelet Count 456 (*)     Neutrophils % 83.6 (*)     Lymphocytes % 10.4 (*)     Neutrophils, Abs 13.05 (*)     Eosinophils % 0.1 (*)     Monocytes, Absolute 0.91 (*)     All other components within normal limits   URINALYSIS, MICROSCOPIC - Abnormal; Notable for the following components:    RBC, UA 25-50 (*)     Bacteria, UA Few (*)     Squamous Epithelial Cells, UA Few (*)     All other components within normal limits   HCG QUALITATIVE URINE - Normal   CBC W/ AUTO DIFFERENTIAL    Narrative:     The following orders were created for panel order CBC auto differential.  Procedure                               Abnormality         Status                     ---------                               -----------         ------                     CBC with Differential[[id number]]        Abnormal            Final result               Manual Differential[[id number]]                                                           Please view results for these tests on the individual orders.          Imaging Results              CT Abdomen Pelvis With Contrast (Final result)  Result time 10/22/23 11:27:22      Final result by Rik Murray MD (10/22/23 11:27:22)                   Impression:      1. No evidence to suggest acute pathology within the abdomen or pelvis.    2.  No nephrolithiasis or hydronephrosis.  No gallstones.  Appendix is not definitely identified.  No secondary signs of acute appendicitis.    Other incidental findings as above.    Place of service: [facility]      Electronically signed by: Rik Murray  Date:    10/22/2023  Time:    11:27               Narrative:    EXAMINATION  CT ABDOMEN PELVIS WITH CONTRAST    CLINICAL HISTORY:  Flank pain, kidney stone suspected;abdominal pain, leukocytosis;    TECHNIQUE:  3 mm axial images were obtained from the lung bases through the ischial tuberosities  status post administration of 100 cc of Isovue-370. No immediate complication from administration of contrast media. Coronal and sagittal reformatted images were additionally created and submitted for review. Total DLP: 899 mGy/cm.    Dose reduction:    The CT exam was performed using one or more dose reduction techniques: Automatic exposure control, automated adjustment of the MA and/or kVP according to patient size, or use of iterative reconstruction technique.    COMPARISON:  None available    FINDINGS:  Lung bases: Lung bases are clear.  No significant pleural/pericardial effusion.    Abdomen:    Liver and Gallbladder: No abnormal enhancement.  No biliary ductal dilatation or gallstones.  Portal vein is patent.    Spleen: Unremarkable    Pancreas: Unremarkable    Adrenals: Unremarkable    Kidneys: Both kidneys are equally perfused and demonstrate no evidence for obstructive uropathy.  Bilateral 2 cm hypodense renal lesions are most compatible with cysts.    Bowel and Mesentery: Small bowel is nondilated.  No free fluid/air within the abdomen or pelvis.  Scattered fecal material is noted throughout the colon which is otherwise grossly unremarkable in course/caliber.  No significant mesenteric adenopathy.  The appendix is not definitely identified.  No secondary signs of acute appendicitis..    Retroperitoneum: No enlarged lymph nodes.    Pelvis:    Bladder: Unremarkable as visualized.    Fluid: No free fluid identified.    Lymph nodes: No enlarged lymph nodes.    Pelvic organs: Unremarkable as visualized.    Osseous structures: No suspicious appearing osseous abnormalities noted.                                       X-Ray Abdomen AP 1 View (KUB) (Final result)  Result time 10/22/23 10:26:56      Final result by Rik Murray MD (10/22/23 10:26:56)                   Impression:      No acute radiographic abnormality in the abdomen.    A mild degree of fecal stasis/constipation is suspected.    Place of  service: [facility]      Electronically signed by: Rik Trevor  Date:    10/22/2023  Time:    10:26               Narrative:    EXAMINATION:  XR ABDOMEN AP 1 VIEW    CLINICAL HISTORY:  Unspecified abdominal pain    TECHNIQUE:  Abdomen AP one-view    COMPARISON:  None    FINDINGS:  Bowel gas pattern is nonspecific and within normal limits. No abnormally dilated small bowel loops to suggest obstruction. There is no free air within the abdomen. There is a scattered amount of stool seen throughout the colon.    No abnormal calcific densities project within the abdomen.    Lung bases are predominantly clear. There is no acute osseous abnormality.                                       Medications   sodium chloride 0.9% bolus 1,000 mL 1,000 mL (0 mLs Intravenous Stopped 10/22/23 1023)   ondansetron injection 4 mg (4 mg Intravenous Given 10/22/23 0910)   pantoprazole injection 40 mg (40 mg Intravenous Given 10/22/23 0911)   cefTRIAXone (ROCEPHIN) 2 g in dextrose 5 % (D5W) 100 mL IVPB (0 g Intravenous Stopped 10/22/23 1011)   iopamidoL (ISOVUE-370) injection 100 mL (100 mLs Intravenous Given 10/22/23 1046)     Medical Decision Making  Reviewed CT Findings w/ patient. No Stones. Bilateral Renal Cysts felt to be Cysts. Advised to f/u with Dr. Ernandez soon. Will return for Renal US next week. Reports pain is resolved. No nausea. Advised to return to ED as needed for worsening.     Amount and/or Complexity of Data Reviewed  Labs: ordered. Decision-making details documented in ED Course.  Radiology: ordered. Decision-making details documented in ED Course.    Risk  Prescription drug management.               ED Course as of 10/22/23 1151   Sun Oct 22, 2023   1105 Patient comfortable in bed. Pain resolved. CT pending.  [WC]      ED Course User Index  [WC] Vince Morrell, NP                    Clinical Impression:   Final diagnoses:  [R10.9] Flank pain  [N28.1] Renal cysts, acquired, bilateral  (Primary)  [D72.829] Leukocytosis, unspecified type        ED Disposition Condition    Discharge Stable          ED Prescriptions       Medication Sig Dispense Start Date End Date Auth. Provider    ondansetron (ZOFRAN) 4 MG tablet Take 1 tablet (4 mg total) by mouth every 6 (six) hours as needed for Nausea. 30 tablet 10/22/2023 -- Vince Morrell, NP    sulfamethoxazole-trimethoprim 800-160mg (BACTRIM DS) 800-160 mg Tab Take 1 tablet by mouth 2 (two) times daily. for 7 days 14 tablet 10/22/2023 10/29/2023 Vince Morrell NP    ketorolac (TORADOL) 10 mg tablet Take 1 tablet (10 mg total) by mouth every 6 (six) hours. for 5 days 20 tablet 10/22/2023 10/27/2023 Vince Morrell NP          Follow-up Information    None          Vince Morrell NP  10/22/23 2033

## 2023-10-22 NOTE — DISCHARGE INSTRUCTIONS
Hydrate--6-8 glasses / bottles daily  Ultrasound tomorrow---or sometime this week  Follow up with Dr. Ernandez  Return to ED as needed for worsening

## 2023-10-25 ENCOUNTER — HOSPITAL ENCOUNTER (OUTPATIENT)
Dept: RADIOLOGY | Facility: HOSPITAL | Age: 33
Discharge: HOME | End: 2023-10-25
Attending: NURSE PRACTITIONER
Payer: COMMERCIAL

## 2023-10-25 DIAGNOSIS — N28.1 RENAL CYSTS, ACQUIRED, BILATERAL: ICD-10-CM

## 2023-10-25 DIAGNOSIS — D72.829 LEUKOCYTOSIS, UNSPECIFIED TYPE: ICD-10-CM

## 2023-10-25 DIAGNOSIS — R10.9 FLANK PAIN: ICD-10-CM

## 2023-10-25 PROCEDURE — 76770 US EXAM ABDO BACK WALL COMP: CPT | Mod: TC

## (undated) DEVICE — SOL NACL IRR 3000ML

## (undated) DEVICE — SEE MEDLINE ITEM 146292

## (undated) DEVICE — Device

## (undated) DEVICE — TOURNIQUET SB QC SP 34X4IN

## (undated) DEVICE — GLOVE BIOGEL SKINSENSE PI 8.5

## (undated) DEVICE — SHAVER TOMCAT 4.0

## (undated) DEVICE — CANISTER SUCTION MEDI-VAC 12L

## (undated) DEVICE — GLOVE 7.0 PROTEXIS PI BLUE

## (undated) DEVICE — SYR 30CC LUER LOCK

## (undated) DEVICE — GLOVE BIOGEL SKINSENSE PI 6.5

## (undated) DEVICE — CANNULA OBT CONICAL TIP 5.5MM

## (undated) DEVICE — GLOVE BIOGEL SKINSENSE PI 8.0

## (undated) DEVICE — BANDAGE PRCAR COMPR 11YDX6IN

## (undated) DEVICE — DEVICE SUCTION H20 BROOM 12FT

## (undated) DEVICE — THERAPY COLD UNIT COMBO SHOULDER AND KNEE

## (undated) DEVICE — TUBING CROSSFLOW INFLOW CASS

## (undated) DEVICE — APPLICATOR CHLORAPREP ORN 26ML

## (undated) DEVICE — SUT PDS II 1 CT VIL MONO 36

## (undated) DEVICE — GLOVE BIOGEL SKINSENSE PI 7.0

## (undated) DEVICE — GOWN NONREINF SET-IN SLV 2XL

## (undated) DEVICE — PACK KNEE ARTHROSCOPY  RUSH

## (undated) DEVICE — SPONGE GZ WOVEN 12-PLY 4X4IN